# Patient Record
Sex: FEMALE | Race: OTHER | HISPANIC OR LATINO
[De-identification: names, ages, dates, MRNs, and addresses within clinical notes are randomized per-mention and may not be internally consistent; named-entity substitution may affect disease eponyms.]

---

## 2017-04-27 ENCOUNTER — APPOINTMENT (OUTPATIENT)
Dept: CARDIOLOGY | Facility: CLINIC | Age: 32
End: 2017-04-27

## 2017-05-17 ENCOUNTER — APPOINTMENT (OUTPATIENT)
Dept: CARDIOLOGY | Facility: CLINIC | Age: 32
End: 2017-05-17

## 2017-06-06 ENCOUNTER — NON-APPOINTMENT (OUTPATIENT)
Age: 32
End: 2017-06-06

## 2017-06-06 ENCOUNTER — APPOINTMENT (OUTPATIENT)
Dept: CARDIOLOGY | Facility: CLINIC | Age: 32
End: 2017-06-06

## 2017-06-06 VITALS
WEIGHT: 208 LBS | HEIGHT: 67 IN | OXYGEN SATURATION: 99 % | HEART RATE: 60 BPM | DIASTOLIC BLOOD PRESSURE: 76 MMHG | BODY MASS INDEX: 32.65 KG/M2 | SYSTOLIC BLOOD PRESSURE: 125 MMHG

## 2017-06-20 ENCOUNTER — CHART COPY (OUTPATIENT)
Age: 32
End: 2017-06-20

## 2017-06-20 ENCOUNTER — APPOINTMENT (OUTPATIENT)
Dept: PLASTIC SURGERY | Facility: CLINIC | Age: 32
End: 2017-06-20

## 2017-06-20 VITALS
HEIGHT: 68 IN | HEART RATE: 68 BPM | OXYGEN SATURATION: 100 % | BODY MASS INDEX: 31.38 KG/M2 | TEMPERATURE: 98.2 F | RESPIRATION RATE: 16 BRPM | DIASTOLIC BLOOD PRESSURE: 67 MMHG | WEIGHT: 207.03 LBS | SYSTOLIC BLOOD PRESSURE: 111 MMHG

## 2017-06-20 DIAGNOSIS — Z95.818 PRESENCE OF OTHER CARDIAC IMPLANTS AND GRAFTS: ICD-10-CM

## 2017-08-30 ENCOUNTER — APPOINTMENT (OUTPATIENT)
Dept: GASTROENTEROLOGY | Facility: CLINIC | Age: 32
End: 2017-08-30
Payer: MEDICAID

## 2017-08-30 VITALS
HEIGHT: 68 IN | RESPIRATION RATE: 18 BRPM | WEIGHT: 203 LBS | HEART RATE: 62 BPM | DIASTOLIC BLOOD PRESSURE: 75 MMHG | SYSTOLIC BLOOD PRESSURE: 123 MMHG | BODY MASS INDEX: 30.77 KG/M2

## 2017-08-30 DIAGNOSIS — J30.9 ALLERGIC RHINITIS, UNSPECIFIED: ICD-10-CM

## 2017-08-30 DIAGNOSIS — K59.09 OTHER CONSTIPATION: ICD-10-CM

## 2017-08-30 DIAGNOSIS — Z80.0 FAMILY HISTORY OF MALIGNANT NEOPLASM OF DIGESTIVE ORGANS: ICD-10-CM

## 2017-08-30 DIAGNOSIS — I48.0 PAROXYSMAL ATRIAL FIBRILLATION: ICD-10-CM

## 2017-08-30 PROCEDURE — 99203 OFFICE O/P NEW LOW 30 MIN: CPT

## 2017-08-30 RX ORDER — CHOLECALCIFEROL (VITAMIN D3) 1250 MCG
1.25 MG CAPSULE ORAL
Refills: 0 | Status: DISCONTINUED | COMMUNITY

## 2017-08-30 RX ORDER — FLUTICASONE PROPIONATE 50 UG/1
50 SPRAY, METERED NASAL
Refills: 0 | Status: DISCONTINUED | COMMUNITY

## 2017-08-30 RX ORDER — LORATADINE 10 MG
10 TABLET ORAL
Refills: 0 | Status: DISCONTINUED | COMMUNITY

## 2017-08-30 RX ORDER — FAMOTIDINE 40 MG/1
40 TABLET, FILM COATED ORAL
Refills: 0 | Status: DISCONTINUED | COMMUNITY

## 2018-06-05 ENCOUNTER — NON-APPOINTMENT (OUTPATIENT)
Age: 33
End: 2018-06-05

## 2018-06-05 ENCOUNTER — APPOINTMENT (OUTPATIENT)
Dept: CARDIOLOGY | Facility: CLINIC | Age: 33
End: 2018-06-05
Payer: COMMERCIAL

## 2018-06-05 VITALS
WEIGHT: 206 LBS | DIASTOLIC BLOOD PRESSURE: 78 MMHG | HEART RATE: 65 BPM | OXYGEN SATURATION: 99 % | BODY MASS INDEX: 31.32 KG/M2 | SYSTOLIC BLOOD PRESSURE: 120 MMHG

## 2018-06-05 DIAGNOSIS — R07.9 CHEST PAIN, UNSPECIFIED: ICD-10-CM

## 2018-06-05 PROCEDURE — 93000 ELECTROCARDIOGRAM COMPLETE: CPT

## 2018-06-05 PROCEDURE — 99215 OFFICE O/P EST HI 40 MIN: CPT

## 2018-06-25 ENCOUNTER — APPOINTMENT (OUTPATIENT)
Dept: GASTROENTEROLOGY | Facility: CLINIC | Age: 33
End: 2018-06-25
Payer: COMMERCIAL

## 2018-06-25 VITALS
SYSTOLIC BLOOD PRESSURE: 120 MMHG | OXYGEN SATURATION: 99 % | DIASTOLIC BLOOD PRESSURE: 70 MMHG | BODY MASS INDEX: 31.52 KG/M2 | HEIGHT: 68 IN | HEART RATE: 62 BPM | WEIGHT: 208 LBS

## 2018-06-25 PROCEDURE — 99214 OFFICE O/P EST MOD 30 MIN: CPT

## 2018-06-25 RX ORDER — MULTIVITAMIN
TABLET ORAL
Refills: 0 | Status: DISCONTINUED | COMMUNITY
End: 2018-06-25

## 2018-06-25 RX ORDER — DOCUSATE SODIUM 100 MG/1
100 CAPSULE ORAL
Refills: 0 | Status: DISCONTINUED | COMMUNITY
End: 2018-06-25

## 2018-07-04 ENCOUNTER — MOBILE ON CALL (OUTPATIENT)
Age: 33
End: 2018-07-04

## 2018-07-10 ENCOUNTER — FORM ENCOUNTER (OUTPATIENT)
Age: 33
End: 2018-07-10

## 2018-07-10 ENCOUNTER — APPOINTMENT (OUTPATIENT)
Dept: PLASTIC SURGERY | Facility: CLINIC | Age: 33
End: 2018-07-10

## 2018-07-11 ENCOUNTER — APPOINTMENT (OUTPATIENT)
Dept: CT IMAGING | Facility: CLINIC | Age: 33
End: 2018-07-11
Payer: COMMERCIAL

## 2018-07-11 ENCOUNTER — OUTPATIENT (OUTPATIENT)
Dept: OUTPATIENT SERVICES | Facility: HOSPITAL | Age: 33
LOS: 1 days | End: 2018-07-11
Payer: COMMERCIAL

## 2018-07-11 DIAGNOSIS — Z95.818 PRESENCE OF OTHER CARDIAC IMPLANTS AND GRAFTS: Chronic | ICD-10-CM

## 2018-07-11 DIAGNOSIS — R10.33 PERIUMBILICAL PAIN: ICD-10-CM

## 2018-07-11 PROCEDURE — 74177 CT ABD & PELVIS W/CONTRAST: CPT | Mod: 26

## 2018-07-11 PROCEDURE — 74177 CT ABD & PELVIS W/CONTRAST: CPT

## 2018-07-28 PROBLEM — Z80.0 FAMILY HISTORY OF COLON CANCER: Status: INACTIVE | Noted: 2017-08-30

## 2018-11-01 ENCOUNTER — OTHER (OUTPATIENT)
Age: 33
End: 2018-11-01

## 2018-11-01 DIAGNOSIS — R10.33 PERIUMBILICAL PAIN: ICD-10-CM

## 2018-11-11 ENCOUNTER — FORM ENCOUNTER (OUTPATIENT)
Age: 33
End: 2018-11-11

## 2018-11-12 ENCOUNTER — OUTPATIENT (OUTPATIENT)
Dept: OUTPATIENT SERVICES | Facility: HOSPITAL | Age: 33
LOS: 1 days | End: 2018-11-12
Payer: COMMERCIAL

## 2018-11-12 ENCOUNTER — APPOINTMENT (OUTPATIENT)
Dept: ULTRASOUND IMAGING | Facility: CLINIC | Age: 33
End: 2018-11-12
Payer: COMMERCIAL

## 2018-11-12 DIAGNOSIS — R10.33 PERIUMBILICAL PAIN: ICD-10-CM

## 2018-11-12 DIAGNOSIS — Z95.818 PRESENCE OF OTHER CARDIAC IMPLANTS AND GRAFTS: Chronic | ICD-10-CM

## 2018-11-12 PROCEDURE — 76700 US EXAM ABDOM COMPLETE: CPT | Mod: 26

## 2018-11-12 PROCEDURE — 76700 US EXAM ABDOM COMPLETE: CPT

## 2018-11-23 ENCOUNTER — TRANSCRIPTION ENCOUNTER (OUTPATIENT)
Age: 33
End: 2018-11-23

## 2019-05-01 ENCOUNTER — ASOB RESULT (OUTPATIENT)
Age: 34
End: 2019-05-01

## 2019-05-01 ENCOUNTER — APPOINTMENT (OUTPATIENT)
Age: 34
End: 2019-05-01
Payer: MEDICAID

## 2019-05-01 PROCEDURE — 76817 TRANSVAGINAL US OBSTETRIC: CPT

## 2019-06-04 ENCOUNTER — APPOINTMENT (OUTPATIENT)
Dept: CARDIOLOGY | Facility: CLINIC | Age: 34
End: 2019-06-04
Payer: MEDICAID

## 2019-06-04 ENCOUNTER — NON-APPOINTMENT (OUTPATIENT)
Age: 34
End: 2019-06-04

## 2019-06-04 VITALS
BODY MASS INDEX: 31.52 KG/M2 | OXYGEN SATURATION: 100 % | HEIGHT: 68 IN | HEART RATE: 71 BPM | SYSTOLIC BLOOD PRESSURE: 130 MMHG | DIASTOLIC BLOOD PRESSURE: 77 MMHG | WEIGHT: 208 LBS

## 2019-06-04 DIAGNOSIS — L91.0 HYPERTROPHIC SCAR: ICD-10-CM

## 2019-06-04 PROCEDURE — 99214 OFFICE O/P EST MOD 30 MIN: CPT

## 2019-06-04 PROCEDURE — 93000 ELECTROCARDIOGRAM COMPLETE: CPT

## 2019-09-01 ENCOUNTER — TRANSCRIPTION ENCOUNTER (OUTPATIENT)
Age: 34
End: 2019-09-01

## 2019-10-07 ENCOUNTER — APPOINTMENT (OUTPATIENT)
Dept: GASTROENTEROLOGY | Facility: CLINIC | Age: 34
End: 2019-10-07
Payer: COMMERCIAL

## 2019-10-07 VITALS
HEART RATE: 80 BPM | SYSTOLIC BLOOD PRESSURE: 140 MMHG | DIASTOLIC BLOOD PRESSURE: 101 MMHG | RESPIRATION RATE: 16 BRPM | BODY MASS INDEX: 31.17 KG/M2 | WEIGHT: 205 LBS | OXYGEN SATURATION: 98 %

## 2019-10-07 DIAGNOSIS — R10.13 EPIGASTRIC PAIN: ICD-10-CM

## 2019-10-07 PROCEDURE — 99214 OFFICE O/P EST MOD 30 MIN: CPT

## 2019-10-07 RX ORDER — UBIDECARENONE 200 MG
500 CAPSULE ORAL
Refills: 0 | Status: DISCONTINUED | COMMUNITY
End: 2019-10-07

## 2019-10-07 RX ORDER — ASPIRIN 81 MG/1
81 TABLET, CHEWABLE ORAL
Refills: 0 | Status: DISCONTINUED | COMMUNITY
End: 2019-10-07

## 2019-10-07 NOTE — ASSESSMENT
[FreeTextEntry1] : Chronic dyspepsia and regurgitation. Note GI alarm symptoms and was restarted on PPI medication. Worse when medications health. Omeprazole was renewed for 6 months. He will be held for 2 weeks prior to the procedure. Upper endoscopy was offered to the patient. The procedure, its risks, benefits, and prepped, were explained to the patient, who understands and is agreeable to proceed. ASA #2. No blood work necessary. Results to follow.

## 2019-10-07 NOTE — HISTORY OF PRESENT ILLNESS
[FreeTextEntry1] : 33-year-old white female, non-English-speaking with history of dyspepsia and heartburn. Patient had been trouble with epigastric and left upper quadrant abdominal pain. Last year. Blood work was unrevealing. Urea breath test was negative. CAT scan and sonogram were negative. Patient had done fairly well over the last year, but over the course of the past few months has developed increasing dyspepsia and heartburn. No alarm symptoms appear bleeding, fever, or weight loss. Omeprazole as represcribed primary care physician and patient was referred for endoscopy. No bleeding. No upper GI alarm symptoms. No lower GI symptoms.

## 2019-10-07 NOTE — PHYSICAL EXAM
[General Appearance - Alert] : alert [General Appearance - In No Acute Distress] : in no acute distress [Sclera] : the sclera and conjunctiva were normal [PERRL With Normal Accommodation] : pupils were equal in size, round, and reactive to light [Extraocular Movements] : extraocular movements were intact [Outer Ear] : the ears and nose were normal in appearance [Oropharynx] : the oropharynx was normal [Neck Appearance] : the appearance of the neck was normal [Neck Cervical Mass (___cm)] : no neck mass was observed [Jugular Venous Distention Increased] : there was no jugular-venous distention [Thyroid Diffuse Enlargement] : the thyroid was not enlarged [Thyroid Nodule] : there were no palpable thyroid nodules [Auscultation Breath Sounds / Voice Sounds] : lungs were clear to auscultation bilaterally [Heart Rate And Rhythm] : heart rate was normal and rhythm regular [Heart Sounds] : normal S1 and S2 [Heart Sounds Gallop] : no gallops [Murmurs] : no murmurs [Heart Sounds Pericardial Friction Rub] : no pericardial rub [Bowel Sounds] : normal bowel sounds [Abdomen Soft] : soft [Abdomen Tenderness] : non-tender [] : no hepato-splenomegaly [Abdomen Mass (___ Cm)] : no abdominal mass palpated [Patient Refused] : rectal exam was refused by the patient [FreeTextEntry1] : Facial garretacea

## 2019-12-30 ENCOUNTER — APPOINTMENT (OUTPATIENT)
Dept: DERMATOLOGY | Facility: CLINIC | Age: 34
End: 2019-12-30
Payer: COMMERCIAL

## 2019-12-30 PROCEDURE — 99202 OFFICE O/P NEW SF 15 MIN: CPT

## 2020-01-10 ENCOUNTER — APPOINTMENT (OUTPATIENT)
Dept: GASTROENTEROLOGY | Facility: GI CENTER | Age: 35
End: 2020-01-10
Payer: COMMERCIAL

## 2020-01-10 ENCOUNTER — OUTPATIENT (OUTPATIENT)
Dept: OUTPATIENT SERVICES | Facility: HOSPITAL | Age: 35
LOS: 1 days | End: 2020-01-10
Payer: COMMERCIAL

## 2020-01-10 ENCOUNTER — RESULT REVIEW (OUTPATIENT)
Age: 35
End: 2020-01-10

## 2020-01-10 DIAGNOSIS — Z95.818 PRESENCE OF OTHER CARDIAC IMPLANTS AND GRAFTS: Chronic | ICD-10-CM

## 2020-01-10 DIAGNOSIS — R12 HEARTBURN: ICD-10-CM

## 2020-01-10 DIAGNOSIS — R10.13 EPIGASTRIC PAIN: ICD-10-CM

## 2020-01-10 DIAGNOSIS — Z86.39 PERSONAL HISTORY OF OTHER ENDOCRINE, NUTRITIONAL AND METABOLIC DISEASE: ICD-10-CM

## 2020-01-10 PROCEDURE — 88305 TISSUE EXAM BY PATHOLOGIST: CPT

## 2020-01-10 PROCEDURE — 43239 EGD BIOPSY SINGLE/MULTIPLE: CPT

## 2020-01-10 PROCEDURE — 88342 IMHCHEM/IMCYTCHM 1ST ANTB: CPT

## 2020-01-10 PROCEDURE — 88342 IMHCHEM/IMCYTCHM 1ST ANTB: CPT | Mod: 26

## 2020-01-10 PROCEDURE — 88305 TISSUE EXAM BY PATHOLOGIST: CPT | Mod: 26

## 2020-01-10 NOTE — HISTORY OF PRESENT ILLNESS
[FreeTextEntry1] : 33 yo WF with dyspepsia & Heartburn.  Neg BW and CT.  No new medical issues.  BMI 32.  No new medical issues.

## 2020-01-10 NOTE — ASSESSMENT
[FreeTextEntry1] : Chronic dyspepsia / NERD.  Normal EGD;  call in 1 week for path check.  Ok to resume PPI prn.  GI office follow up in 2 months

## 2020-01-10 NOTE — PROCEDURE
[With Biopsy] : with biopsy [Dyspepsia] : dyspepsia [Epigastric Pain] : epigastric pain [Procedure Explained] : The procedure was explained [Heartburn] : heartburn [Allergies Reviewed] : allergies reviewed. [Benefits] : benefits [Risks] : Risks [Alternatives] : alternatives [Consent Obtained] : written consent was obtained prior to the procedure and is detailed in the patient's record [Cardiac Monitor] : cardiac monitor [Automated Blood Pressure Cuff] : automated blood pressure cuff [Patient] : the patient [Pulse Oximeter] : pulse oximeter [Propofol ___ mg IV] : Propofol [unfilled] ~Umg intravenously [3] : 3 [Normal] : Normal [Sent to Pathology] : was sent to pathology for analysis [Vital Signs Stable] : the vital signs were stable [Tolerated Well] : the patient tolerated the procedure well [de-identified] : 4 random biopsies were done.  [de-identified] : Z line intact at 38 cm.  no HH, EE, BE, Varices.   [de-identified] : [id number] [de-identified] : Normal Endoscopy exam.  Biopsies done for HP.   [de-identified] : 2 random biopsies were done.

## 2020-01-10 NOTE — PROCEDURE
[With Biopsy] : with biopsy [Dyspepsia] : dyspepsia [Heartburn] : heartburn [Epigastric Pain] : epigastric pain [Procedure Explained] : The procedure was explained [Allergies Reviewed] : allergies reviewed. [Benefits] : benefits [Risks] : Risks [Alternatives] : alternatives [Consent Obtained] : written consent was obtained prior to the procedure and is detailed in the patient's record [Cardiac Monitor] : cardiac monitor [Patient] : the patient [Automated Blood Pressure Cuff] : automated blood pressure cuff [Pulse Oximeter] : pulse oximeter [3] : 3 [Propofol ___ mg IV] : Propofol [unfilled] ~Umg intravenously [Sent to Pathology] : was sent to pathology for analysis [Normal] : Normal [Vital Signs Stable] : the vital signs were stable [Tolerated Well] : the patient tolerated the procedure well [de-identified] : Z line intact at 38 cm.  no HH, EE, BE, Varices.   [de-identified] : 4 random biopsies were done.  [de-identified] : [id number] [de-identified] : 2 random biopsies were done.  [de-identified] : Normal Endoscopy exam.  Biopsies done for HP.

## 2020-01-10 NOTE — HISTORY OF PRESENT ILLNESS
[FreeTextEntry1] : 35 yo WF with dyspepsia & Heartburn.  Neg BW and CT.  No new medical issues.  BMI 32.  No new medical issues.

## 2020-01-10 NOTE — PHYSICAL EXAM
[General Appearance - In No Acute Distress] : in no acute distress [General Appearance - Alert] : alert [Sclera] : the sclera and conjunctiva were normal [Extraocular Movements] : extraocular movements were intact [PERRL With Normal Accommodation] : pupils were equal in size, round, and reactive to light [Outer Ear] : the ears and nose were normal in appearance [Oropharynx] : the oropharynx was normal [Neck Appearance] : the appearance of the neck was normal [Jugular Venous Distention Increased] : there was no jugular-venous distention [Neck Cervical Mass (___cm)] : no neck mass was observed [Thyroid Diffuse Enlargement] : the thyroid was not enlarged [Thyroid Nodule] : there were no palpable thyroid nodules [Heart Rate And Rhythm] : heart rate was normal and rhythm regular [Heart Sounds] : normal S1 and S2 [Auscultation Breath Sounds / Voice Sounds] : lungs were clear to auscultation bilaterally [Murmurs] : no murmurs [Heart Sounds Pericardial Friction Rub] : no pericardial rub [Heart Sounds Gallop] : no gallops [Bowel Sounds] : normal bowel sounds [Abdomen Tenderness] : non-tender [Abdomen Soft] : soft [] : no hepato-splenomegaly [Abdomen Mass (___ Cm)] : no abdominal mass palpated [FreeTextEntry1] : No scars or organomegaly.  no hernias [Patient Refused] : rectal exam was refused by the patient

## 2020-01-10 NOTE — PHYSICAL EXAM
[General Appearance - In No Acute Distress] : in no acute distress [General Appearance - Alert] : alert [Sclera] : the sclera and conjunctiva were normal [Extraocular Movements] : extraocular movements were intact [PERRL With Normal Accommodation] : pupils were equal in size, round, and reactive to light [Oropharynx] : the oropharynx was normal [Neck Appearance] : the appearance of the neck was normal [Outer Ear] : the ears and nose were normal in appearance [Jugular Venous Distention Increased] : there was no jugular-venous distention [Neck Cervical Mass (___cm)] : no neck mass was observed [Thyroid Nodule] : there were no palpable thyroid nodules [Thyroid Diffuse Enlargement] : the thyroid was not enlarged [Heart Sounds] : normal S1 and S2 [Heart Rate And Rhythm] : heart rate was normal and rhythm regular [Auscultation Breath Sounds / Voice Sounds] : lungs were clear to auscultation bilaterally [Heart Sounds Pericardial Friction Rub] : no pericardial rub [Heart Sounds Gallop] : no gallops [Murmurs] : no murmurs [Bowel Sounds] : normal bowel sounds [Abdomen Soft] : soft [Abdomen Tenderness] : non-tender [] : no hepato-splenomegaly [Abdomen Mass (___ Cm)] : no abdominal mass palpated [FreeTextEntry1] : No scars or organomegaly.  no hernias [Patient Refused] : rectal exam was refused by the patient

## 2020-01-15 LAB — SURGICAL PATHOLOGY STUDY: SIGNIFICANT CHANGE UP

## 2020-01-30 ENCOUNTER — APPOINTMENT (OUTPATIENT)
Dept: DERMATOLOGY | Facility: CLINIC | Age: 35
End: 2020-01-30
Payer: COMMERCIAL

## 2020-01-30 PROCEDURE — 99213 OFFICE O/P EST LOW 20 MIN: CPT

## 2020-06-16 ENCOUNTER — APPOINTMENT (OUTPATIENT)
Dept: CARDIOLOGY | Facility: CLINIC | Age: 35
End: 2020-06-16

## 2020-06-24 ENCOUNTER — APPOINTMENT (OUTPATIENT)
Dept: CARDIOLOGY | Facility: CLINIC | Age: 35
End: 2020-06-24

## 2020-07-29 ENCOUNTER — APPOINTMENT (OUTPATIENT)
Dept: CARDIOLOGY | Facility: CLINIC | Age: 35
End: 2020-07-29
Payer: COMMERCIAL

## 2020-07-29 VITALS
BODY MASS INDEX: 30.41 KG/M2 | SYSTOLIC BLOOD PRESSURE: 110 MMHG | OXYGEN SATURATION: 97 % | HEART RATE: 67 BPM | DIASTOLIC BLOOD PRESSURE: 78 MMHG | TEMPERATURE: 98.4 F | WEIGHT: 200 LBS

## 2020-07-29 DIAGNOSIS — R10.13 EPIGASTRIC PAIN: ICD-10-CM

## 2020-07-29 PROCEDURE — 99214 OFFICE O/P EST MOD 30 MIN: CPT

## 2020-07-29 PROCEDURE — 93000 ELECTROCARDIOGRAM COMPLETE: CPT

## 2020-07-29 RX ORDER — ASPIRIN 81 MG/1
81 TABLET ORAL DAILY
Qty: 30 | Refills: 2 | Status: ACTIVE | COMMUNITY
Start: 2020-07-29

## 2020-08-02 ENCOUNTER — NON-APPOINTMENT (OUTPATIENT)
Age: 35
End: 2020-08-02

## 2021-08-03 ENCOUNTER — APPOINTMENT (OUTPATIENT)
Dept: CARDIOLOGY | Facility: CLINIC | Age: 36
End: 2021-08-03
Payer: COMMERCIAL

## 2021-08-03 VITALS
SYSTOLIC BLOOD PRESSURE: 130 MMHG | TEMPERATURE: 98.5 F | BODY MASS INDEX: 32.28 KG/M2 | WEIGHT: 213 LBS | HEIGHT: 68 IN | HEART RATE: 69 BPM | OXYGEN SATURATION: 99 % | DIASTOLIC BLOOD PRESSURE: 82 MMHG

## 2021-08-03 PROCEDURE — 99214 OFFICE O/P EST MOD 30 MIN: CPT

## 2021-08-03 PROCEDURE — 93000 ELECTROCARDIOGRAM COMPLETE: CPT

## 2021-08-03 RX ORDER — BACILLUS COAGULANS/INULIN 1B-250 MG
CAPSULE ORAL
Refills: 0 | Status: DISCONTINUED | COMMUNITY
End: 2021-08-03

## 2021-08-03 RX ORDER — OMEPRAZOLE 40 MG/1
40 CAPSULE, DELAYED RELEASE ORAL
Qty: 90 | Refills: 1 | Status: DISCONTINUED | COMMUNITY
Start: 2019-10-07 | End: 2021-08-03

## 2021-08-03 RX ORDER — FAMOTIDINE 40 MG/1
40 TABLET, FILM COATED ORAL
Qty: 90 | Refills: 1 | Status: DISCONTINUED | COMMUNITY
Start: 2020-01-10 | End: 2021-08-03

## 2021-08-27 ENCOUNTER — APPOINTMENT (OUTPATIENT)
Dept: CARDIOLOGY | Facility: CLINIC | Age: 36
End: 2021-08-27
Payer: COMMERCIAL

## 2021-08-27 PROCEDURE — 93306 TTE W/DOPPLER COMPLETE: CPT

## 2021-10-05 ENCOUNTER — APPOINTMENT (OUTPATIENT)
Dept: DERMATOLOGY | Facility: CLINIC | Age: 36
End: 2021-10-05
Payer: COMMERCIAL

## 2021-10-05 PROCEDURE — 99214 OFFICE O/P EST MOD 30 MIN: CPT

## 2022-04-14 ENCOUNTER — TRANSCRIPTION ENCOUNTER (OUTPATIENT)
Age: 37
End: 2022-04-14

## 2022-08-02 ENCOUNTER — NON-APPOINTMENT (OUTPATIENT)
Age: 37
End: 2022-08-02

## 2022-08-02 ENCOUNTER — APPOINTMENT (OUTPATIENT)
Dept: CARDIOLOGY | Facility: CLINIC | Age: 37
End: 2022-08-02

## 2022-08-02 VITALS
DIASTOLIC BLOOD PRESSURE: 80 MMHG | HEIGHT: 68 IN | TEMPERATURE: 98.2 F | BODY MASS INDEX: 31.52 KG/M2 | SYSTOLIC BLOOD PRESSURE: 122 MMHG | OXYGEN SATURATION: 97 % | HEART RATE: 60 BPM | WEIGHT: 208 LBS

## 2022-08-02 PROCEDURE — 93000 ELECTROCARDIOGRAM COMPLETE: CPT

## 2022-08-02 PROCEDURE — 99214 OFFICE O/P EST MOD 30 MIN: CPT | Mod: 25

## 2024-01-08 ENCOUNTER — ASOB RESULT (OUTPATIENT)
Age: 39
End: 2024-01-08

## 2024-01-08 ENCOUNTER — APPOINTMENT (OUTPATIENT)
Dept: ANTEPARTUM | Facility: CLINIC | Age: 39
End: 2024-01-08
Payer: MEDICAID

## 2024-01-08 PROCEDURE — 76801 OB US < 14 WKS SINGLE FETUS: CPT

## 2024-01-08 PROCEDURE — 76815 OB US LIMITED FETUS(S): CPT

## 2024-01-22 ENCOUNTER — APPOINTMENT (OUTPATIENT)
Dept: ANTEPARTUM | Facility: CLINIC | Age: 39
End: 2024-01-22

## 2024-01-29 ENCOUNTER — ASOB RESULT (OUTPATIENT)
Age: 39
End: 2024-01-29

## 2024-01-29 ENCOUNTER — APPOINTMENT (OUTPATIENT)
Dept: ANTEPARTUM | Facility: CLINIC | Age: 39
End: 2024-01-29
Payer: MEDICAID

## 2024-01-29 PROCEDURE — 76815 OB US LIMITED FETUS(S): CPT

## 2024-03-18 ENCOUNTER — ASOB RESULT (OUTPATIENT)
Age: 39
End: 2024-03-18

## 2024-03-18 ENCOUNTER — APPOINTMENT (OUTPATIENT)
Dept: ANTEPARTUM | Facility: CLINIC | Age: 39
End: 2024-03-18
Payer: MEDICAID

## 2024-03-18 PROCEDURE — 76817 TRANSVAGINAL US OBSTETRIC: CPT

## 2024-03-18 PROCEDURE — 76805 OB US >/= 14 WKS SNGL FETUS: CPT | Mod: 59

## 2024-04-02 ENCOUNTER — EMERGENCY (EMERGENCY)
Facility: HOSPITAL | Age: 39
LOS: 1 days | Discharge: HOME | End: 2024-04-02
Attending: STUDENT IN AN ORGANIZED HEALTH CARE EDUCATION/TRAINING PROGRAM
Payer: MEDICAID

## 2024-04-02 VITALS
WEIGHT: 199.96 LBS | SYSTOLIC BLOOD PRESSURE: 121 MMHG | RESPIRATION RATE: 16 BRPM | HEART RATE: 64 BPM | OXYGEN SATURATION: 99 % | DIASTOLIC BLOOD PRESSURE: 72 MMHG | HEIGHT: 65 IN | TEMPERATURE: 97 F

## 2024-04-02 VITALS — OXYGEN SATURATION: 100 % | RESPIRATION RATE: 17 BRPM | HEART RATE: 63 BPM | TEMPERATURE: 98 F

## 2024-04-02 DIAGNOSIS — Z95.818 PRESENCE OF OTHER CARDIAC IMPLANTS AND GRAFTS: Chronic | ICD-10-CM

## 2024-04-02 LAB
ALBUMIN SERPL ELPH-MCNC: 3.3 G/DL — SIGNIFICANT CHANGE UP (ref 3.3–5.2)
ALP SERPL-CCNC: 73 U/L — SIGNIFICANT CHANGE UP (ref 40–120)
ALT FLD-CCNC: 12 U/L — SIGNIFICANT CHANGE UP
ANION GAP SERPL CALC-SCNC: 10 MMOL/L — SIGNIFICANT CHANGE UP (ref 5–17)
APPEARANCE UR: CLEAR — SIGNIFICANT CHANGE UP
AST SERPL-CCNC: 15 U/L — SIGNIFICANT CHANGE UP
BACTERIA # UR AUTO: NEGATIVE /HPF — SIGNIFICANT CHANGE UP
BASOPHILS # BLD AUTO: 0.03 K/UL — SIGNIFICANT CHANGE UP (ref 0–0.2)
BASOPHILS NFR BLD AUTO: 0.3 % — SIGNIFICANT CHANGE UP (ref 0–2)
BILIRUB SERPL-MCNC: <0.2 MG/DL — LOW (ref 0.4–2)
BILIRUB UR-MCNC: NEGATIVE — SIGNIFICANT CHANGE UP
BUN SERPL-MCNC: 8.2 MG/DL — SIGNIFICANT CHANGE UP (ref 8–20)
CALCIUM SERPL-MCNC: 8.7 MG/DL — SIGNIFICANT CHANGE UP (ref 8.4–10.5)
CAST: 5 /LPF — HIGH (ref 0–4)
CHLORIDE SERPL-SCNC: 100 MMOL/L — SIGNIFICANT CHANGE UP (ref 96–108)
CO2 SERPL-SCNC: 21 MMOL/L — LOW (ref 22–29)
COLOR SPEC: YELLOW — SIGNIFICANT CHANGE UP
CREAT SERPL-MCNC: 0.48 MG/DL — LOW (ref 0.5–1.3)
DIFF PNL FLD: NEGATIVE — SIGNIFICANT CHANGE UP
EGFR: 126 ML/MIN/1.73M2 — SIGNIFICANT CHANGE UP
EOSINOPHIL # BLD AUTO: 0.04 K/UL — SIGNIFICANT CHANGE UP (ref 0–0.5)
EOSINOPHIL NFR BLD AUTO: 0.4 % — SIGNIFICANT CHANGE UP (ref 0–6)
GLUCOSE SERPL-MCNC: 88 MG/DL — SIGNIFICANT CHANGE UP (ref 70–99)
GLUCOSE UR QL: NEGATIVE MG/DL — SIGNIFICANT CHANGE UP
HCT VFR BLD CALC: 30.3 % — LOW (ref 34.5–45)
HGB BLD-MCNC: 10.1 G/DL — LOW (ref 11.5–15.5)
IMM GRANULOCYTES NFR BLD AUTO: 0.6 % — SIGNIFICANT CHANGE UP (ref 0–0.9)
KETONES UR-MCNC: NEGATIVE MG/DL — SIGNIFICANT CHANGE UP
LEUKOCYTE ESTERASE UR-ACNC: ABNORMAL
LYMPHOCYTES # BLD AUTO: 1.31 K/UL — SIGNIFICANT CHANGE UP (ref 1–3.3)
LYMPHOCYTES # BLD AUTO: 13.4 % — SIGNIFICANT CHANGE UP (ref 13–44)
MCHC RBC-ENTMCNC: 26.9 PG — LOW (ref 27–34)
MCHC RBC-ENTMCNC: 33.3 GM/DL — SIGNIFICANT CHANGE UP (ref 32–36)
MCV RBC AUTO: 80.8 FL — SIGNIFICANT CHANGE UP (ref 80–100)
MONOCYTES # BLD AUTO: 0.81 K/UL — SIGNIFICANT CHANGE UP (ref 0–0.9)
MONOCYTES NFR BLD AUTO: 8.3 % — SIGNIFICANT CHANGE UP (ref 2–14)
NEUTROPHILS # BLD AUTO: 7.54 K/UL — HIGH (ref 1.8–7.4)
NEUTROPHILS NFR BLD AUTO: 77 % — SIGNIFICANT CHANGE UP (ref 43–77)
NITRITE UR-MCNC: NEGATIVE — SIGNIFICANT CHANGE UP
PH UR: 7.5 — SIGNIFICANT CHANGE UP (ref 5–8)
PLATELET # BLD AUTO: 196 K/UL — SIGNIFICANT CHANGE UP (ref 150–400)
POTASSIUM SERPL-MCNC: 4.1 MMOL/L — SIGNIFICANT CHANGE UP (ref 3.5–5.3)
POTASSIUM SERPL-SCNC: 4.1 MMOL/L — SIGNIFICANT CHANGE UP (ref 3.5–5.3)
PROT SERPL-MCNC: 6.7 G/DL — SIGNIFICANT CHANGE UP (ref 6.6–8.7)
PROT UR-MCNC: SIGNIFICANT CHANGE UP MG/DL
RBC # BLD: 3.75 M/UL — LOW (ref 3.8–5.2)
RBC # FLD: 14.7 % — HIGH (ref 10.3–14.5)
RBC CASTS # UR COMP ASSIST: 1 /HPF — SIGNIFICANT CHANGE UP (ref 0–4)
SODIUM SERPL-SCNC: 131 MMOL/L — LOW (ref 135–145)
SP GR SPEC: 1.01 — SIGNIFICANT CHANGE UP (ref 1–1.03)
SQUAMOUS # UR AUTO: 4 /HPF — SIGNIFICANT CHANGE UP (ref 0–5)
UROBILINOGEN FLD QL: 1 MG/DL — SIGNIFICANT CHANGE UP (ref 0.2–1)
WBC # BLD: 9.79 K/UL — SIGNIFICANT CHANGE UP (ref 3.8–10.5)
WBC # FLD AUTO: 9.79 K/UL — SIGNIFICANT CHANGE UP (ref 3.8–10.5)
WBC UR QL: 9 /HPF — HIGH (ref 0–5)

## 2024-04-02 PROCEDURE — 93005 ELECTROCARDIOGRAM TRACING: CPT

## 2024-04-02 PROCEDURE — 85025 COMPLETE CBC W/AUTO DIFF WBC: CPT

## 2024-04-02 PROCEDURE — 82962 GLUCOSE BLOOD TEST: CPT

## 2024-04-02 PROCEDURE — 99284 EMERGENCY DEPT VISIT MOD MDM: CPT | Mod: 25

## 2024-04-02 PROCEDURE — 93010 ELECTROCARDIOGRAM REPORT: CPT

## 2024-04-02 PROCEDURE — 36415 COLL VENOUS BLD VENIPUNCTURE: CPT

## 2024-04-02 PROCEDURE — 80053 COMPREHEN METABOLIC PANEL: CPT

## 2024-04-02 PROCEDURE — 81001 URINALYSIS AUTO W/SCOPE: CPT

## 2024-04-02 PROCEDURE — 99285 EMERGENCY DEPT VISIT HI MDM: CPT

## 2024-04-02 PROCEDURE — 87086 URINE CULTURE/COLONY COUNT: CPT

## 2024-04-02 RX ORDER — SODIUM CHLORIDE 9 MG/ML
1000 INJECTION INTRAMUSCULAR; INTRAVENOUS; SUBCUTANEOUS ONCE
Refills: 0 | Status: COMPLETED | OUTPATIENT
Start: 2024-04-02 | End: 2024-04-02

## 2024-04-02 RX ADMIN — SODIUM CHLORIDE 1000 MILLILITER(S): 9 INJECTION INTRAMUSCULAR; INTRAVENOUS; SUBCUTANEOUS at 11:24

## 2024-04-02 NOTE — ED PROVIDER NOTE - NSICDXFAMILYHX_GEN_ALL_CORE_FT
FAMILY HISTORY:  Father  Still living? Yes, Estimated age: 41-50  Family history of hypotension, Age at diagnosis: Age Unknown    Mother  Still living? Yes, Estimated age: 41-50  Family history of hypertension, Age at diagnosis: Age Unknown

## 2024-04-02 NOTE — ED ADULT NURSE NOTE - OBJECTIVE STATEMENT
Patient care assumed at 10:17am, A&O x 4, respiration even and unlabored, patient is a 38y old female who is currently pregnant ,  reports to ED with complain of dizziness and episode of syncope. Patients reports seeing dark which lasted for about 15mins with similar episode on Saturday which lasted about 5mins.  Patient also reports diaphoresis and earing beeping sound bilaterally. Patient denies any pain or discomfort. VSS. No cough, SOB or respiratory distress noted. Patient on Tele box with NSR. Safety maintained

## 2024-04-02 NOTE — ED ADULT NURSE NOTE - NSICDXPASTSURGICALHX_GEN_ALL_CORE_FT
PAST SURGICAL HISTORY:  Status post placement of implantable loop recorder [employer] Linq loop recorder implanted 4/10/2015

## 2024-04-02 NOTE — ED PROVIDER NOTE - NSICDXPASTSURGICALHX_GEN_ALL_CORE_FT
PAST SURGICAL HISTORY:  Status post placement of implantable loop recorder [employer] Linq loop recorder implanted 4/10/2015

## 2024-04-02 NOTE — ED ADULT TRIAGE NOTE - BP NONINVASIVE SYSTOLIC (MM HG)
[FreeTextEntry1] : Please refer to URO Consult note \par \par FUA elevated PSA and balanitis \par elevated PSA normalized to  0.83 \par balanitis \par avoid topical alcohol in foreskin and glands \par use clotrimazole cream \par 
121

## 2024-04-02 NOTE — ED PROVIDER NOTE - CLINICAL SUMMARY MEDICAL DECISION MAKING FREE TEXT BOX
38 year old female A1 w/PMHx pAfib s/p loop recorder s/p explanted 2016 BIBEMS from home for near syncope. Vitals stable. Patient appears well, alert and oriented. Examination benign. Lungs clear bilaterally. Will obtain labs, chest xray, EKG, UA+urine culture. Likely vasovagal/orthostatic given history and presentation. 38 year old female A1 w/PMHx pAfib s/p loop recorder s/p explanted 2016 BIBEMS from home for near syncope. Vitals stable. Patient appears well, alert and oriented. Examination benign. Lungs clear bilaterally. Will obtain labs, chest xray, EKG, UA+urine culture. Likely vasovagal/orthostatic given history and presentation. Labs non concerning. Bedside US showing good fetal movements and  with adequate variation. Patient will be discharged at this time with advise to follow up with their cardiologist and gyn. Patient understands and in agreement with plan.

## 2024-04-02 NOTE — ED PROCEDURE NOTE - PROCEDURE NAME, MLM
Point of Care Ultrasound OB Pelvic Vera Ricks is a 41 y.o. female who was seen in clinic today (2020) for an acute visit. Subjective:   Kamilla Garcia was seen today for   No chief complaint on file. 42 yo who works at [employer] [id number]. who arrives with ear pain, sinus congestion and sore throat. Tested negative about a week ago at [employer] [address].. Denies tick bite, fever or SOB. Exposed to C19+ patients at work. Recent Travel Screening and Travel History documentation     Travel Screening      No screening recorded since 20 0000      Travel History   Travel since 20     No documented travel since 20                 ROS - Pertinent items are noted in HPI    Patient Active Problem List   Diagnosis Code    IUGR (intrauterine growth restriction) [id number]    Oligohydramnios antepartum O41.00X0    Depression complicating pregnancy, antepartum O99.340, F32.9    H/O  section Z98.891    Environmental and seasonal allergies J30.89    Insomnia due to stress F51.02    Allergy desensitization therapy Z51.6    Recurrent depression ([place] [address].) F33.9    Vitamin D deficiency E55.9    Abscess L02.91     Home Medications    Medication Sig Start Date End Date Taking? Authorizing Provider   amoxicillin (AMOXIL) 500 mg capsule Take 1 Cap by mouth three (3) times daily for 10 days. 20 Yes Roman Arauz MD   methylPREDNISolone (MEDROL DOSEPACK) 4 mg tablet As directed 20   Shayy Smith,    montelukast (SINGULAIR) 10 mg tablet Take 1 Tab by mouth every evening. 19   Belgica Stroud NP   acetaminophen (TYLENOL EXTRA STRENGTH) 500 mg tablet Take  by mouth every six (6) hours as needed for Pain. Provider, Historical   azelastine (ASTELIN) 137 mcg (0.1 %) nasal spray 1 [place] by Both Nostrils route two (2) times a day.  Use in each nostril as directed  Indications: Seasonal Runny Nose, Vasomotor Rhinitis 19   Larraamanda RILEY NP   ipratropium (ATROVENT) 0.03 % nasal spray 2 Sprays by Both Nostrils route every twelve (12) hours. 5/14/19   Trice Baldwin, NP   valACYclovir (VALTREX) 1 gram tablet 2 tablets initially then 2 tablets q 12 hours as needed for lesion  Indications: Cold Sore 4/1/19   Trice Baldwin, NP   levonorgestrel (MIRENA) 20 mcg/24 hr (5 years) IUD 1 Each by IntraUTERine route once. Provider, Historical      Allergies   Allergen Reactions    Azithromycin Swelling     z pack          Objective:   Physical Exam    There were no vitals taken for this visit. General:  NAD. Nontoxic  Neck:  appears supple without nodes or JVD  Oropharynx:  no exudate  Respiratory:  speaks in complete sentences. Unlabored. CTA without wheezes, rales or ronchi    Cardiac:  RRR without MGR  Skin:  No rash      Assessment/Plan:     There is a LOW probability that this is COVID-19. Differential diagnosed was reviewed and favor: strep pharyngitis. I discussed this is most likely a bacterial infection. We reviewed treatment options including prescription & OTC medications. Will start meds below. Red flags were reviewed, expected time course for resolution was discussed, and when to follow up with their PCP. Diagnoses and all orders for this visit:    1. Sore throat  -     amoxicillin (AMOXIL) 500 mg capsule; Take 1 Cap by mouth three (3) times daily for 10 days. 2. COVID-19  -     NOVEL CORONAVIRUS (COVID-19)         Reviewed with her that COVID-19 pandemic is an evolving situation with rapidly changing recommendations & guidelines. Medical decisions are made based on the the best information available at the time. Recommended she stay tuned for updates published by trusted sources and to advise your PCP of any unexpected changes in clinical condition       Disclaimer:  Discussed expected course/resolution/complications of diagnosis in detail with patient. Medication risks/benefits/costs/interactions/alternatives discussed with patient.   She was given an after visit summary which includes diagnoses, current medications, & vitals. She expressed understanding with the diagnosis and plan. Aspects of this note may have been generated using voice recognition software. Despite editing, there may be some syntax errors.        Josh Farley MD

## 2024-04-02 NOTE — ED PROVIDER NOTE - ATTENDING CONTRIBUTION TO CARE
38F A1 presenting with light headedness, worse on sitting to standing, suspect mild dehydration as patient without any infectious symptoms or chest pain, no leg swelling; FHR on bedside sono ~130-140, good fetal movement. Will check labs including basics, ua; EKG is unchanged from prior and only shows a twi in III; will follow up studies, reassess, dispo.

## 2024-04-02 NOTE — ED PROVIDER NOTE - PATIENT PORTAL LINK FT
You can access the FollowMyHealth Patient Portal offered by [facility] by registering at the following website: [email or web address]. By joining [employer]’s FollowMyHealth portal, you will also be able to view your health information using other applications (apps) compatible with our system.

## 2024-04-02 NOTE — ED ADULT NURSE REASSESSMENT NOTE - NS ED NURSE REASSESS COMMENT FT1
Pt is A&O4, pt able to ambulate safely and steadily w/out assistance, denies dizziness/weakness upon standing, patients IV was removed and the catheter is in tact, bleeding controlled, pt discharged home and paperwork was signed, reviewed with patient. Follow up treatment and plan for discharge was reviewed with the patient. Vital Signs recorded in the EMR. Pt education deemed successful at time of discharge after teach back proves proficiency. Pt has no distress at time of discharge, pt provided discharge instruction paperwork.

## 2024-04-02 NOTE — ED ADULT TRIAGE NOTE - CHIEF COMPLAINT QUOTE
Pt BIBA from home for near syncopal episode. Pt states she has generalized weakness and ws feeling lightheaded for a few minutes. Has similar symptoms on Saturday. No H/O diabetes or HTN. Currently 22 weeks pregnant.

## 2024-04-02 NOTE — ED PROVIDER NOTE - OBJECTIVE STATEMENT
38 year old female A1 w/PMHx pAfib s/p loop recorder s/p explanted  BIBEMS from home for near syncope. Patient states that around 1 hour ago felt weak, lightheaded, and vision blackening. Episode lasted 2 minutes and resolved with sitting down. Had a similar episode on saturday that also resolved with rest. Denies any chest pain, palpitations, dizziness, headaches, SOB, numbness, tingling, or syncope. Denies any dysuria, hematuria, vaginal discharge or abd pain. Normal bowel and bladder movements. Has been regularly following with Dr. Gonsales (gyn). Notes that she has not seen a cardiologist since explantation of loop recorder. Currently on aspirin and prenatal vitamins.

## 2024-04-02 NOTE — ED PROVIDER NOTE - NSFOLLOWUPINSTRUCTIONS_ED_ALL_ED_FT
Near-Syncope       Near-syncope is when you suddenly feel like you might pass out (faint), but you do not actually lose consciousness. This may also be referred to as presyncope. During an episode of near-syncope, you may:    Feel dizzy, weak, or light-headed.  Feel nauseous.  See all white or all black in your field of vision, or see spots.  Have cold, clammy skin.    This condition is caused by a sudden decrease in blood flow to the brain. This decrease can result from various causes, but most of those causes are not dangerous. However, near-syncope may be a sign of a serious medical problem, so it is important to seek medical care.    Follow these instructions at home:      Medicines    Take over-the-counter and prescription medicines only as told by your health care provider.  If you are taking blood pressure or heart medicine, get up slowly and take several minutes to sit and then stand. This can reduce dizziness.        General instructions    Pay attention to any changes in your symptoms.  Talk with your health care provider about your symptoms. You may need to have testing to understand the cause of your near-syncope.  If you start to feel like you might faint, lie down right away and raise (elevate) your feet above the level of your heart. Breathe deeply and steadily. Wait until all of the symptoms have passed.  Have someone stay with you until you feel stable.  Do not drive, use machinery, or play sports until your health care provider says it is okay.  Drink enough fluid to keep your urine pale yellow.  Keep all follow-up visits as told by your health care provider. This is important.    Get help right away if you:  Have a seizure.  Have unusual pain in your chest, abdomen, or back.  Faint once or repeatedly.  Have a severe headache.  Are bleeding from your mouth or rectum, or you have black or tarry stool.  Have a very fast or irregular heartbeat (palpitations).  Are confused.  Have trouble walking.  Have severe weakness.  Have vision problems.    These symptoms may represent a serious problem that is an emergency. Do not wait to see if your symptoms will go away. Get medical help right away. Call your local emergency services (911 in the [place] Do not drive yourself to the hospital.    Summary  Near-syncope is when you suddenly feel like you might pass out (faint), but you do not actually lose consciousness.  This condition is caused by a sudden decrease in blood flow to the brain. This decrease can result from various causes, but most of those causes are not dangerous.  Near-syncope may be a sign of a serious medical problem, so it is important to seek medical care.    ADDITIONAL NOTES AND INSTRUCTIONS    Please follow up with your Primary MD in 24-48 hr.  Seek immediate medical care for any new/worsening signs or symptoms.

## 2024-04-04 LAB
CULTURE RESULTS: SIGNIFICANT CHANGE UP
SPECIMEN SOURCE: SIGNIFICANT CHANGE UP

## 2024-04-13 NOTE — ED POST DISCHARGE NOTE - RESULT SUMMARY
patient called back regarding received letter of abnormal culture results, informed patient UC contaminated and to have rpt if having urinary symptoms with PCP, patient understands and agrees to proceed.

## 2024-04-16 ENCOUNTER — NON-APPOINTMENT (OUTPATIENT)
Age: 39
End: 2024-04-16

## 2024-04-16 ENCOUNTER — APPOINTMENT (OUTPATIENT)
Dept: CARDIOLOGY | Facility: CLINIC | Age: 39
End: 2024-04-16
Payer: MEDICAID

## 2024-04-16 VITALS
HEART RATE: 73 BPM | TEMPERATURE: 97.2 F | SYSTOLIC BLOOD PRESSURE: 115 MMHG | HEIGHT: 68 IN | DIASTOLIC BLOOD PRESSURE: 74 MMHG | OXYGEN SATURATION: 100 % | WEIGHT: 214 LBS | BODY MASS INDEX: 32.43 KG/M2

## 2024-04-16 DIAGNOSIS — R00.2 PALPITATIONS: ICD-10-CM

## 2024-04-16 PROCEDURE — 93242 EXT ECG>48HR<7D RECORDING: CPT

## 2024-04-16 PROCEDURE — 99214 OFFICE O/P EST MOD 30 MIN: CPT

## 2024-04-16 PROCEDURE — G2211 COMPLEX E/M VISIT ADD ON: CPT | Mod: NC,1L

## 2024-04-16 PROCEDURE — 93000 ELECTROCARDIOGRAM COMPLETE: CPT | Mod: 59

## 2024-04-16 RX ORDER — PV W-O VIT A/FERROUS FUM/FOLIC 40 MG-1 MG
TABLET ORAL DAILY
Refills: 0 | Status: ACTIVE | COMMUNITY

## 2024-04-16 NOTE — HISTORY OF PRESENT ILLNESS
[FreeTextEntry1] : Patient with history of palpitations, noted to have afib on EKG- self converted to normal rhythm. No further palpitations episodes.  Chest pain: Has had episodes of chest pain, not associated with activity, left sided and occasional middle of the chest.  12/2015- Normal stress test. Unclear etiology of afib. Improved sxs of CP, SOB. Chest wall pain around the loop recorder. No episodes of afib on loop recorder.   5/2016- Has had recurrent pain around the loop recorder site. Was planned for removal but canceled appt. No further afib events.   6/2019- Stable. No chest pain. Loop recorder removed.   7/29/2020 Pt is here for routine f/u. Reports of feeling good. Got Covid 19 last March - stayed at home only. Has occasional epigastric pain. Denies anginal pain,  shortness of breath, fatigue, palpitations, syncope or near syncope, orthopnea and PND. No regular exercises, but plays frequently with her kids and does chores at home. No activity intolerance. No leg edema. Had labs from her PCP this month  8/2022- stable. no chest pain. Occasional fatigue. No palpitations. No syncope.   4/2024- Went to [facility] with symptoms of dizziness. Worked up and negative. Pregnant 23 weeks. No syncope.

## 2024-04-16 NOTE — DISCUSSION/SUMMARY
[FreeTextEntry1] : 1. Afib: No further events. Doing well. Loop removed a few years prior. Recurrent dizziness but lasted for 15 minutes. Will place monitor for 5 days. Holter placed in monitor today. ? of Dehydration.  2. Continue aspirin.  3. Advised to partake in at least 150 mins/week of moderate intensity exercise like brisk walking. 4. Follow up 2-3 months.    [EKG obtained to assist in diagnosis and management of assessed problem(s)] : EKG obtained to assist in diagnosis and management of assessed problem(s)

## 2024-05-19 ENCOUNTER — NON-APPOINTMENT (OUTPATIENT)
Age: 39
End: 2024-05-19

## 2024-06-10 ENCOUNTER — APPOINTMENT (OUTPATIENT)
Dept: ANTEPARTUM | Facility: CLINIC | Age: 39
End: 2024-06-10
Payer: MEDICAID

## 2024-06-10 ENCOUNTER — ASOB RESULT (OUTPATIENT)
Age: 39
End: 2024-06-10

## 2024-06-10 PROCEDURE — 76819 FETAL BIOPHYS PROFIL W/O NST: CPT

## 2024-06-10 PROCEDURE — 76816 OB US FOLLOW-UP PER FETUS: CPT

## 2024-06-25 ENCOUNTER — APPOINTMENT (OUTPATIENT)
Dept: CARDIOLOGY | Facility: CLINIC | Age: 39
End: 2024-06-25
Payer: MEDICAID

## 2024-06-25 VITALS
HEIGHT: 68 IN | WEIGHT: 219 LBS | OXYGEN SATURATION: 97 % | HEART RATE: 73 BPM | DIASTOLIC BLOOD PRESSURE: 80 MMHG | BODY MASS INDEX: 33.19 KG/M2 | SYSTOLIC BLOOD PRESSURE: 122 MMHG

## 2024-06-25 DIAGNOSIS — I48.91 UNSPECIFIED ATRIAL FIBRILLATION: ICD-10-CM

## 2024-06-25 PROCEDURE — 93000 ELECTROCARDIOGRAM COMPLETE: CPT

## 2024-06-25 PROCEDURE — 99212 OFFICE O/P EST SF 10 MIN: CPT | Mod: 25

## 2024-07-09 ENCOUNTER — APPOINTMENT (OUTPATIENT)
Dept: ANTEPARTUM | Facility: CLINIC | Age: 39
End: 2024-07-09
Payer: MEDICAID

## 2024-07-09 ENCOUNTER — ASOB RESULT (OUTPATIENT)
Age: 39
End: 2024-07-09

## 2024-07-09 PROCEDURE — 76816 OB US FOLLOW-UP PER FETUS: CPT

## 2024-07-09 PROCEDURE — 76819 FETAL BIOPHYS PROFIL W/O NST: CPT

## 2024-07-25 ENCOUNTER — INPATIENT (INPATIENT)
Facility: HOSPITAL | Age: 39
LOS: 2 days | Discharge: HOME | End: 2024-07-28
Attending: SPECIALIST | Admitting: SPECIALIST
Payer: MEDICAID

## 2024-07-25 VITALS — HEART RATE: 77 BPM | SYSTOLIC BLOOD PRESSURE: 140 MMHG | DIASTOLIC BLOOD PRESSURE: 80 MMHG

## 2024-07-25 DIAGNOSIS — O26.899 OTHER SPECIFIED PREGNANCY RELATED CONDITIONS, UNSPECIFIED TRIMESTER: ICD-10-CM

## 2024-07-25 DIAGNOSIS — Z95.818 PRESENCE OF OTHER CARDIAC IMPLANTS AND GRAFTS: Chronic | ICD-10-CM

## 2024-07-25 DIAGNOSIS — O26.893 OTHER SPECIFIED PREGNANCY RELATED CONDITIONS, THIRD TRIMESTER: ICD-10-CM

## 2024-07-25 LAB
ALBUMIN SERPL ELPH-MCNC: 3.5 G/DL — SIGNIFICANT CHANGE UP (ref 3.3–5.2)
ALP SERPL-CCNC: 170 U/L — HIGH (ref 40–120)
ALT FLD-CCNC: 16 U/L — SIGNIFICANT CHANGE UP
ANION GAP SERPL CALC-SCNC: 16 MMOL/L — SIGNIFICANT CHANGE UP (ref 5–17)
APPEARANCE UR: ABNORMAL
APTT BLD: 25.6 SEC — SIGNIFICANT CHANGE UP (ref 24.5–35.6)
AST SERPL-CCNC: 18 U/L — SIGNIFICANT CHANGE UP
BACTERIA # UR AUTO: NEGATIVE /HPF — SIGNIFICANT CHANGE UP
BASOPHILS # BLD AUTO: 0.03 K/UL — SIGNIFICANT CHANGE UP (ref 0–0.2)
BASOPHILS NFR BLD AUTO: 0.3 % — SIGNIFICANT CHANGE UP (ref 0–2)
BILIRUB SERPL-MCNC: 0.2 MG/DL — LOW (ref 0.4–2)
BILIRUB UR-MCNC: NEGATIVE — SIGNIFICANT CHANGE UP
BLD GP AB SCN SERPL QL: SIGNIFICANT CHANGE UP
BUN SERPL-MCNC: 6.4 MG/DL — LOW (ref 8–20)
CALCIUM SERPL-MCNC: 8.8 MG/DL — SIGNIFICANT CHANGE UP (ref 8.4–10.5)
CHLORIDE SERPL-SCNC: 101 MMOL/L — SIGNIFICANT CHANGE UP (ref 96–108)
CO2 SERPL-SCNC: 19 MMOL/L — LOW (ref 22–29)
COLOR SPEC: YELLOW — SIGNIFICANT CHANGE UP
CREAT ?TM UR-MCNC: 75 MG/DL — SIGNIFICANT CHANGE UP
CREAT SERPL-MCNC: 0.55 MG/DL — SIGNIFICANT CHANGE UP (ref 0.5–1.3)
DIFF PNL FLD: NEGATIVE — SIGNIFICANT CHANGE UP
EGFR: 122 ML/MIN/1.73M2 — SIGNIFICANT CHANGE UP
EOSINOPHIL # BLD AUTO: 0.01 K/UL — SIGNIFICANT CHANGE UP (ref 0–0.5)
EOSINOPHIL NFR BLD AUTO: 0.1 % — SIGNIFICANT CHANGE UP (ref 0–6)
FIBRINOGEN PPP-MCNC: 677 MG/DL — HIGH (ref 200–450)
GLUCOSE SERPL-MCNC: 60 MG/DL — LOW (ref 70–99)
GLUCOSE UR QL: NEGATIVE MG/DL — SIGNIFICANT CHANGE UP
HCT VFR BLD CALC: 33.8 % — LOW (ref 34.5–45)
HGB BLD-MCNC: 10.8 G/DL — LOW (ref 11.5–15.5)
HIV 1 & 2 AB SERPL IA.RAPID: SIGNIFICANT CHANGE UP
IMM GRANULOCYTES NFR BLD AUTO: 0.5 % — SIGNIFICANT CHANGE UP (ref 0–0.9)
INR BLD: 0.94 RATIO — SIGNIFICANT CHANGE UP (ref 0.85–1.18)
KETONES UR-MCNC: 80 MG/DL
LDH SERPL L TO P-CCNC: 161 U/L — SIGNIFICANT CHANGE UP (ref 98–192)
LEUKOCYTE ESTERASE UR-ACNC: ABNORMAL
LYMPHOCYTES # BLD AUTO: 1.49 K/UL — SIGNIFICANT CHANGE UP (ref 1–3.3)
LYMPHOCYTES # BLD AUTO: 14.7 % — SIGNIFICANT CHANGE UP (ref 13–44)
MCHC RBC-ENTMCNC: 24.9 PG — LOW (ref 27–34)
MCHC RBC-ENTMCNC: 32 GM/DL — SIGNIFICANT CHANGE UP (ref 32–36)
MCV RBC AUTO: 77.9 FL — LOW (ref 80–100)
MONOCYTES # BLD AUTO: 0.61 K/UL — SIGNIFICANT CHANGE UP (ref 0–0.9)
MONOCYTES NFR BLD AUTO: 6 % — SIGNIFICANT CHANGE UP (ref 2–14)
NEUTROPHILS # BLD AUTO: 7.93 K/UL — HIGH (ref 1.8–7.4)
NEUTROPHILS NFR BLD AUTO: 78.4 % — HIGH (ref 43–77)
NITRITE UR-MCNC: NEGATIVE — SIGNIFICANT CHANGE UP
PH UR: 6.5 — SIGNIFICANT CHANGE UP (ref 5–8)
PLATELET # BLD AUTO: 209 K/UL — SIGNIFICANT CHANGE UP (ref 150–400)
POTASSIUM SERPL-MCNC: 4.1 MMOL/L — SIGNIFICANT CHANGE UP (ref 3.5–5.3)
POTASSIUM SERPL-SCNC: 4.1 MMOL/L — SIGNIFICANT CHANGE UP (ref 3.5–5.3)
PROT ?TM UR-MCNC: 9 MG/DL — SIGNIFICANT CHANGE UP (ref 0–12)
PROT SERPL-MCNC: 7.2 G/DL — SIGNIFICANT CHANGE UP (ref 6.6–8.7)
PROT UR-MCNC: NEGATIVE MG/DL — SIGNIFICANT CHANGE UP
PROT/CREAT UR-RTO: 0.1 RATIO — SIGNIFICANT CHANGE UP
PROTHROM AB SERPL-ACNC: 10.4 SEC — SIGNIFICANT CHANGE UP (ref 9.5–13)
RBC # BLD: 4.34 M/UL — SIGNIFICANT CHANGE UP (ref 3.8–5.2)
RBC # FLD: 17.9 % — HIGH (ref 10.3–14.5)
RBC CASTS # UR COMP ASSIST: 1 /HPF — SIGNIFICANT CHANGE UP (ref 0–4)
SODIUM SERPL-SCNC: 136 MMOL/L — SIGNIFICANT CHANGE UP (ref 135–145)
SP GR SPEC: 1.01 — SIGNIFICANT CHANGE UP (ref 1–1.03)
SQUAMOUS # UR AUTO: 2 /HPF — SIGNIFICANT CHANGE UP (ref 0–5)
URATE SERPL-MCNC: 3.4 MG/DL — SIGNIFICANT CHANGE UP (ref 2.4–5.7)
UROBILINOGEN FLD QL: 1 MG/DL — SIGNIFICANT CHANGE UP (ref 0.2–1)
WBC # BLD: 10.12 K/UL — SIGNIFICANT CHANGE UP (ref 3.8–10.5)
WBC # FLD AUTO: 10.12 K/UL — SIGNIFICANT CHANGE UP (ref 3.8–10.5)
WBC UR QL: 2 /HPF — SIGNIFICANT CHANGE UP (ref 0–5)

## 2024-07-25 PROCEDURE — 88307 TISSUE EXAM BY PATHOLOGIST: CPT | Mod: 26

## 2024-07-25 RX ORDER — CEFAZOLIN SODIUM 10 G
VIAL (EA) INJECTION
Refills: 0 | Status: DISCONTINUED | OUTPATIENT
Start: 2024-07-25 | End: 2024-07-25

## 2024-07-25 RX ORDER — CEFAZOLIN SODIUM 10 G
VIAL (EA) INJECTION
Refills: 0 | Status: DISCONTINUED | OUTPATIENT
Start: 2024-07-25 | End: 2024-07-27

## 2024-07-25 RX ORDER — TRISODIUM CITRATE DIHYDRATE AND CITRIC ACID MONOHYDRATE 500; 334 MG/5ML; MG/5ML
15 SOLUTION ORAL EVERY 6 HOURS
Refills: 0 | Status: DISCONTINUED | OUTPATIENT
Start: 2024-07-25 | End: 2024-07-26

## 2024-07-25 RX ORDER — CHLORHEXIDINE GLUCONATE 500 MG/1
1 CLOTH TOPICAL DAILY
Refills: 0 | Status: DISCONTINUED | OUTPATIENT
Start: 2024-07-25 | End: 2024-07-26

## 2024-07-25 RX ORDER — OXYTOCIN/RINGER'S LACTATE 20/1000 ML
333.33 PLASTIC BAG, INJECTION (ML) INTRAVENOUS
Qty: 20 | Refills: 0 | Status: COMPLETED | OUTPATIENT
Start: 2024-07-25 | End: 2024-07-25

## 2024-07-25 RX ORDER — MAGNESIUM SULFATE 500 MG/ML
4 VIAL (ML) INJECTION ONCE
Refills: 0 | Status: COMPLETED | OUTPATIENT
Start: 2024-07-25 | End: 2024-07-26

## 2024-07-25 RX ORDER — OXYTOCIN/RINGER'S LACTATE 20/1000 ML
2 PLASTIC BAG, INJECTION (ML) INTRAVENOUS
Qty: 30 | Refills: 0 | Status: DISCONTINUED | OUTPATIENT
Start: 2024-07-25 | End: 2024-07-28

## 2024-07-25 RX ORDER — CEFAZOLIN SODIUM 10 G
1000 VIAL (EA) INJECTION EVERY 8 HOURS
Refills: 0 | Status: DISCONTINUED | OUTPATIENT
Start: 2024-07-26 | End: 2024-07-27

## 2024-07-25 RX ORDER — MAGNESIUM SULFATE 500 MG/ML
2 VIAL (ML) INJECTION
Qty: 40 | Refills: 0 | Status: DISCONTINUED | OUTPATIENT
Start: 2024-07-25 | End: 2024-07-26

## 2024-07-25 RX ORDER — CEFAZOLIN SODIUM 10 G
2000 VIAL (EA) INJECTION ONCE
Refills: 0 | Status: COMPLETED | OUTPATIENT
Start: 2024-07-25 | End: 2024-07-25

## 2024-07-25 RX ORDER — DEXTROSE MONOHYDRATE, SODIUM CHLORIDE, SODIUM LACTATE, CALCIUM CHLORIDE, MAGNESIUM CHLORIDE 1.5; 538; 448; 18.4; 5.08 G/100ML; MG/100ML; MG/100ML; MG/100ML; MG/100ML
1000 SOLUTION INTRAPERITONEAL
Refills: 0 | Status: DISCONTINUED | OUTPATIENT
Start: 2024-07-25 | End: 2024-07-26

## 2024-07-25 RX ADMIN — Medication 2 MILLIUNIT(S)/MIN: at 19:24

## 2024-07-25 RX ADMIN — Medication 2000 MILLIGRAM(S): at 19:24

## 2024-07-25 RX ADMIN — DEXTROSE MONOHYDRATE, SODIUM CHLORIDE, SODIUM LACTATE, CALCIUM CHLORIDE, MAGNESIUM CHLORIDE 125 MILLILITER(S): 1.5; 538; 448; 18.4; 5.08 SOLUTION INTRAPERITONEAL at 19:25

## 2024-07-25 NOTE — OB RN PATIENT PROFILE - AS SC BRADEN ACTIVITY
Lab work today.   Meds refilled today.     Consider colon cancer screening, breast cancer screening.        (4) walks frequently

## 2024-07-25 NOTE — OB PROVIDER H&P - NSLOWPPHRISK_OBGYN_A_OB
No previous uterine incision/Tesfaye Pregnancy/Less than or equal to 4 previous vaginal births/No known bleeding disorder/No history of postpartum hemorrhage/No other PPH risks indicated

## 2024-07-25 NOTE — OB PROVIDER H&P - HISTORY OF PRESENT ILLNESS
38y  at 38w1d GA by LMP consistent with second trimester sono who presented to L&D with an elevated BP reading of 130/92. Seen by her OB provider in clinic this morning - SVE was 0/80/-2, soft consistency of cervix.   Patient denies vaginal bleeding and leakage of fluid. She endorses good fetal movement. Currently getting occasional abdominal pain/suprapubic pressure - described as mild contractions.  Denies fevers, chills, nausea, vomiting, chest pain, SOB, dizziness and headache. No other complaints at this time.     NOA: Aug-  LMP:   Prenatal course is significant for:  Prenatal course uncomplicated.      POB:  PGYN: -fibroids, -ovarian cysts, denies STD hx, denies abnormal PAPs   PMH: Denies  PSH: Denies  SH: Denies EtOH, tobacco and illicit drug use during this pregnancy; feels safe at home   Meds: PNVs  Allergies: NKDA    BMI:  Sono:  EFW:     T(C): --  HR: 85 (24 @ 16:23) (76 - 85)  BP: 145/88 (24 @ 16:23) (140/80 - 146/81)  RR: --  SpO2: --    Gen: NAD, well-appearing, AAOx3   Abd: Soft, gravid  Ext: non-tender, non-edematous  SSE:   SVE:    Bedside sono:  FHT:  [place]:       A/P:   -Admit to L&D  -Consent  -Admission labs  -NPO, except ice chips   -IV fluids  -Labor: Intact/*ROM. Latent/Active labor. Katherin *.   -Fetus: Cat I tracing. Continuous toco and fetal monitoring.   -GBS: Negative, no GBS ppx required   -Analgesia:     Discussed with Dr. Newton 38y  at 38w1d GA by LMP consistent with second trimester sono who presented to L&D with an elevated BP reading of 130/92. Seen by her OB provider in clinic this morning - SVE was 0/80/-2, soft consistency of cervix.   Patient denies vaginal bleeding and leakage of fluid. She endorses good fetal movement. Currently getting occasional abdominal pain/suprapubic pressure - described as mild contractions.  Denies fevers, chills, nausea, vomiting, chest pain, SOB, dizziness and headache. No other complaints at this time.     NOA: Aug-  LMP: OCT-    Prenatal course is significant for:  Mid-range BP levels from roughly 31 weeks of gestation. She was being seen weekly by her OB provider. Never needed a standing BP medication order.   Patient has had occasional headaches, mostly mild in nature and respond to Tylenol.         POB: - SAB @ 11wks in 2019.  X2-  and . No complications pre/postpartum.   PGYN: -fibroids, -ovarian cysts, denies STD hx, denies abnormal PAPs   PMH: Afib in . Was monitored closely. Has since resolved. Was recently seen in April by her Cardiologist 2/2 feeling disoriented. Full cardiac check-up done - nil of note.   PSH: Denies  SH: Denies EtOH, tobacco and illicit drug use during this pregnancy; feels safe at home   Meds: PNVs  Allergies: Penicillin - rash/hives    Sono: Vertex presentation, posterior    EFW: 2684g (33rd percentile) on 2024 ~ 3184g   BMI: 34.2     38y  at 38w1d GA by LMP consistent with second trimester sono who presented to L&D with an elevated BP reading of 130/92. Seen by her OB provider in clinic this morning - SVE  0/80/-2, soft consistency of cervix.   Patient denies vaginal bleeding and leakage of fluid. She endorses good fetal movement. Currently getting occasional abdominal pain/suprapubic pressure - described as mild contractions.  Denies fevers, chills, nausea, vomiting, chest pain, SOB, dizziness and headache. No other complaints at this time.     NOA: Aug-  LMP: OCT-    Prenatal course is significant for:  Mid-range BP levels from roughly 31 weeks of gestation. She was being seen weekly by her OB provider. Never needed a standing BP medication order.   Patient has had occasional headaches, mostly mild in nature and respond to Tylenol.         POB: - SAB @ 11wks in 2019.  X2-  and . No complications pre/postpartum.   PGYN: -fibroids, -ovarian cysts, denies STD hx, denies abnormal PAPs   PMH: Afib in . Was monitored closely. Has since resolved. Was recently seen in April by her Cardiologist 2/2 feeling disoriented. Full cardiac check-up done - nil of note.   PSH: Denies  SH: Denies EtOH, tobacco and illicit drug use during this pregnancy; feels safe at home   Meds: PNVs  Allergies: Penicillin - rash/hives    GBS- positive, no cultures present     Sono: Vertex presentation, posterior    EFW: 2684g (33rd percentile) on 2024 ~ 3184g   BMI: 34.2     38y  at 38w1d GA by LMP  who presented to L&D with an elevated BP reading of 130/92. Seen by her OB provider in clinic this morning - SVE  0/80/-2, soft consistency of cervix.   Patient denies vaginal bleeding and leakage of fluid. She endorses good fetal movement. Currently getting occasional abdominal pain/suprapubic pressure - described as mild contractions.  Denies fevers, chills, nausea, vomiting, chest pain, SOB, dizziness and headache. No other complaints at this time.     NOA: Aug-  LMP: OCT-    Prenatal course is significant for:  Mid-range BP levels from roughly 31 weeks of gestation. She was being seen weekly by her OB provider. Never needed a standing BP medication order.   Patient has had occasional headaches, mostly mild in nature and respond to Tylenol.         POB: - SAB @ 11wks in 2019.  X2-  and . No complications pre/postpartum.   PGYN: -fibroids, -ovarian cysts, denies STD hx, denies abnormal PAPs   PMH: Afib in . Was monitored closely. Has since resolved. Was recently seen in April by her Cardiologist 2/2 feeling disoriented. Full cardiac check-up done - nil of note.   PSH: Denies  SH: Denies EtOH, tobacco and illicit drug use during this pregnancy; feels safe at home   Meds: PNVs  Allergies: Penicillin - rash/hives    GBS- positive, no cultures present     Sono: Vertex presentation, posterior    EFW: 2684g (33rd percentile) on 2024 ~ 3184g   BMI: 34.2

## 2024-07-25 NOTE — OB PROVIDER H&P - NSHPPHYSICALEXAM_GEN_ALL_CORE
T(C): --  HR: 85 (07-25-24 @ 16:23) (76 - 85)  BP: 145/88 (07-25-24 @ 16:23) (140/80 - 146/81)  RR: 17  SpO2: 99    Gen: NAD, well-appearing, AAOx3   Abd: Soft, gravid  Ext: non-tender, non-edematous  SVE:  0/80/-2 @ 1pm  Bedside sono: vertex presentation  FHT: 140bpm, moderate variability, accels, no decels  [employer]: Regular CTX, q7- patient currently only experiencing mild pain/pressure T(C): --  HR: 85 (07-25-24 @ 16:23) (76 - 85)  BP: 145/88 (07-25-24 @ 16:23) (140/80 - 146/81)  RR: 17  SpO2: 99    Gen: NAD, well-appearing, AAOx3   Abd: Soft, gravid  Ext: non-tender, non-edematous  SVE:  0/80/-2 @ 1pm by Dr Pizano  Bedside sono: vertex presentation  FHT: 140bpm, moderate variability, accels, no decels  [place]: Regular CTX, q7- patient currently only experiencing mild pain/pressure

## 2024-07-25 NOTE — OB PROVIDER H&P - ASSESSMENT
A/P: 38y  at 38w1d for IOL 2/2 gHTN    -Admit to L&D  -Consent  -Admission labs  -NPO, except ice chips   -IV fluids  -Labor: Intact. Latent labor. Katherin q7  -Fetus: Cat I tracing. Continuous toco and fetal monitoring.   -GBS: Negative, no GBS ppx required   -Analgesia: Epidural prn  -As per Dr TERRY Pizano: Start patient on Pitocin and insert Cook Balloon    Discussed with Dr. TERRY Pizano A/P: 38y  at 38w1d for IOL 2/2 gHTN    -Admit to L&D  -Consent  -Admission labs  -IV fluids  -Labor: Intact. Latent labor. Katherin q7  -Fetus: Cat I tracing. Continuous toco and fetal monitoring.   -GBS: Positive GBSx; Abx required  -PIH labs ordered   -Hep C Ab and Rubeola IgG ordered   -Analgesia: Epidural prn  -As per Dr TERRY Pizano: Start patient on Pitocin and insert Cook Balloon    Discussed with Dr. TERRY Pizano A/P: 38y  at 38w1d for IOL 2/2 gHTN    -Admit to L&D  -Consent  -Admission labs  -IV fluids  -Labor: Intact. Latent labor. Katherin q7  -Fetus: Cat I tracing. Continuous toco and fetal monitoring.   -GBS: Positive GBSx; Penicillin allergy-rash. Discussed with Dr Pizano. Cefazolin prescribed.  -PIH labs ordered   -Hep C Ab and Rubeola IgG ordered   -Analgesia: Epidural prn  -As per Dr TERRY Pizano: Start patient on Pitocin and insert Cook Balloon    Discussed with Dr. TERRY Pizano

## 2024-07-25 NOTE — OB PROVIDER H&P - ATTENDING COMMENTS
Pt is a 38y  at 38w3d by LMP for IOL 2/2 gHTN  Pt was seen in her primary OB Dr Pizano's office and had documented elevated bp of 130/92 at 11am today  When patient arrived on L+D bps mostly 140s/90s, meeting criteria for dx of gestational HTN with her documented elevated bp at 11 am as above.  Pt denies any headache, visual changes or epigastric discomfort.    Plan:  Admit to L+D  Per Dr Pizano, pt to be started on pitocin for IOL for gHTN (VE by Dr Pizano in office 0/80%)  Analgesia prn  PEC labs sent, results pending  Continue close monitoring of her blood pressures at this time  Will place CB when able   Informed consent obtained and plan for IOL d/w pt.  All questions answered.    Pt will accept blood products if needed.    FARHAT Fitzgerald (OB hospitalist) Pt is a 38y  at 38w3d by LMP for IOL 2/2 gHTN  Pt was seen in her primary OB Dr Pizano's office and had documented elevated bp of 130/92 at 11am today  When patient arrived on L+D bps mostly 140s/90s, meeting criteria for dx of gestational HTN with her documented elevated bp at 11 am as above.  Pt denies any headache, visual changes or epigastric discomfort.    Plan:  Admit to L+D  Per Dr Pizano, pt to be started on pitocin for IOL for gHTN (VE by Dr Pizano in office 0/80%)  Analgesia prn  PEC labs sent, results pending  Continue close monitoring of her blood pressures at this time  Will place CB when able   Per Dr Pizano, pt is GBS positive (no hard copy available) pt reports PCN allergy (hives), low risk for anaphylaxis will start  for GBS prophylaxis with cefazolin.  Informed consent obtained and plan for IOL d/w pt.  All questions answered.    Pt will accept blood products if needed.    FARHAT Fitzgerald (OB hospitalist)

## 2024-07-26 LAB
HCT VFR BLD CALC: 32.5 % — LOW (ref 34.5–45)
HCV AB S/CO SERPL IA: 0.08 S/CO — SIGNIFICANT CHANGE UP (ref 0–0.99)
HCV AB SERPL-IMP: SIGNIFICANT CHANGE UP
HGB BLD-MCNC: 10.5 G/DL — LOW (ref 11.5–15.5)
HIV 1+2 AB+HIV1 P24 AG SERPL QL IA: SIGNIFICANT CHANGE UP
MAGNESIUM SERPL-MCNC: 4.5 MG/DL — HIGH (ref 1.8–2.6)
MAGNESIUM SERPL-MCNC: 4.6 MG/DL — HIGH (ref 1.6–2.6)
MAGNESIUM SERPL-MCNC: 4.6 MG/DL — HIGH (ref 1.6–2.6)
MCHC RBC-ENTMCNC: 25 PG — LOW (ref 27–34)
MCHC RBC-ENTMCNC: 32.3 GM/DL — SIGNIFICANT CHANGE UP (ref 32–36)
MCV RBC AUTO: 77.4 FL — LOW (ref 80–100)
MEV IGG SER-ACNC: >300 AU/ML — SIGNIFICANT CHANGE UP
MEV IGG+IGM SER-IMP: POSITIVE — SIGNIFICANT CHANGE UP
PLATELET # BLD AUTO: 201 K/UL — SIGNIFICANT CHANGE UP (ref 150–400)
RBC # BLD: 4.2 M/UL — SIGNIFICANT CHANGE UP (ref 3.8–5.2)
RBC # FLD: 17.8 % — HIGH (ref 10.3–14.5)
T PALLIDUM AB TITR SER: NEGATIVE — SIGNIFICANT CHANGE UP
WBC # BLD: 10.31 K/UL — SIGNIFICANT CHANGE UP (ref 3.8–10.5)
WBC # FLD AUTO: 10.31 K/UL — SIGNIFICANT CHANGE UP (ref 3.8–10.5)

## 2024-07-26 PROCEDURE — 93010 ELECTROCARDIOGRAM REPORT: CPT

## 2024-07-26 RX ORDER — CARBOPROST TROMETHAMINE 250 UG/ML
0.25 INJECTION, SOLUTION INTRAMUSCULAR ONCE
Refills: 0 | Status: COMPLETED | OUTPATIENT
Start: 2024-07-26 | End: 2024-07-26

## 2024-07-26 RX ORDER — IBUPROFEN 200 MG
600 TABLET ORAL EVERY 6 HOURS
Refills: 0 | Status: DISCONTINUED | OUTPATIENT
Start: 2024-07-26 | End: 2024-07-28

## 2024-07-26 RX ORDER — ACETAMINOPHEN 500 MG
975 TABLET ORAL
Refills: 0 | Status: DISCONTINUED | OUTPATIENT
Start: 2024-07-26 | End: 2024-07-28

## 2024-07-26 RX ORDER — ONDANSETRON HCL/PF 4 MG/2 ML
4 VIAL (ML) INJECTION ONCE
Refills: 0 | Status: COMPLETED | OUTPATIENT
Start: 2024-07-26 | End: 2024-07-26

## 2024-07-26 RX ORDER — DIPHENHYDRAMINE HCL 25 MG
25 CAPSULE ORAL EVERY 6 HOURS
Refills: 0 | Status: DISCONTINUED | OUTPATIENT
Start: 2024-07-26 | End: 2024-07-28

## 2024-07-26 RX ORDER — LANOLIN 100 %
1 OINTMENT (GRAM) TOPICAL EVERY 6 HOURS
Refills: 0 | Status: DISCONTINUED | OUTPATIENT
Start: 2024-07-26 | End: 2024-07-28

## 2024-07-26 RX ORDER — NIFEDIPINE 20 MG/1
30 CAPSULE, LIQUID FILLED ORAL DAILY
Refills: 0 | Status: DISCONTINUED | OUTPATIENT
Start: 2024-07-26 | End: 2024-07-27

## 2024-07-26 RX ORDER — IBUPROFEN 200 MG
600 TABLET ORAL EVERY 6 HOURS
Refills: 0 | Status: COMPLETED | OUTPATIENT
Start: 2024-07-26 | End: 2025-06-24

## 2024-07-26 RX ORDER — DIBUCAINE 1 %
1 OINTMENT (GRAM) TOPICAL EVERY 6 HOURS
Refills: 0 | Status: DISCONTINUED | OUTPATIENT
Start: 2024-07-26 | End: 2024-07-28

## 2024-07-26 RX ORDER — MAGNESIUM HYDROXIDE 400 MG/5ML
30 SUSPENSION, ORAL (FINAL DOSE FORM) ORAL
Refills: 0 | Status: DISCONTINUED | OUTPATIENT
Start: 2024-07-26 | End: 2024-07-28

## 2024-07-26 RX ORDER — OXYTOCIN/RINGER'S LACTATE 20/1000 ML
41.67 PLASTIC BAG, INJECTION (ML) INTRAVENOUS
Qty: 20 | Refills: 0 | Status: DISCONTINUED | OUTPATIENT
Start: 2024-07-26 | End: 2024-07-28

## 2024-07-26 RX ORDER — DIPHENOXYLATE HCL/ATROPINE 2.5-.025MG
1 TABLET ORAL ONCE
Refills: 0 | Status: DISCONTINUED | OUTPATIENT
Start: 2024-07-26 | End: 2024-07-26

## 2024-07-26 RX ORDER — CRANBERRY FRUIT EXTRACT 650 MG
1 CAPSULE ORAL DAILY
Refills: 0 | Status: DISCONTINUED | OUTPATIENT
Start: 2024-07-26 | End: 2024-07-28

## 2024-07-26 RX ORDER — KETOROLAC TROMETHAMINE 10 MG
30 TABLET ORAL ONCE
Refills: 0 | Status: DISCONTINUED | OUTPATIENT
Start: 2024-07-26 | End: 2024-07-26

## 2024-07-26 RX ORDER — HYDROCORTISONE 1 %
1 CREAM (GRAM) TOPICAL EVERY 6 HOURS
Refills: 0 | Status: DISCONTINUED | OUTPATIENT
Start: 2024-07-26 | End: 2024-07-28

## 2024-07-26 RX ORDER — BACTERIOSTATIC SODIUM CHLORIDE 0.9 %
3 VIAL (ML) INJECTION EVERY 8 HOURS
Refills: 0 | Status: DISCONTINUED | OUTPATIENT
Start: 2024-07-26 | End: 2024-07-28

## 2024-07-26 RX ORDER — SIMETHICONE 125 MG/1
80 TABLET, CHEWABLE ORAL EVERY 4 HOURS
Refills: 0 | Status: DISCONTINUED | OUTPATIENT
Start: 2024-07-26 | End: 2024-07-28

## 2024-07-26 RX ORDER — WITCH HAZEL 500 MG/1
1 CLOTH TOPICAL EVERY 4 HOURS
Refills: 0 | Status: DISCONTINUED | OUTPATIENT
Start: 2024-07-26 | End: 2024-07-28

## 2024-07-26 RX ORDER — CLOSTRIDIUM TETANI TOXOID ANTIGEN (FORMALDEHYDE INACTIVATED), CORYNEBACTERIUM DIPHTHERIAE TOXOID ANTIGEN (FORMALDEHYDE INACTIVATED), BORDETELLA PERTUSSIS TOXOID ANTIGEN (GLUTARALDEHYDE INACTIVATED), BORDETELLA PERTUSSIS FILAMENTOUS HEMAGGLUTININ ANTIGEN (FORMALDEHYDE INACTIVATED), BORDETELLA PERTUSSIS PERTACTIN ANTIGEN, AND BORDETELLA PERTUSSIS FIMBRIAE 2/3 ANTIGEN 5; 2; 2.5; 5; 3; 5 [LF]/.5ML; [LF]/.5ML; UG/.5ML; UG/.5ML; UG/.5ML; UG/.5ML
0.5 INJECTION, SUSPENSION INTRAMUSCULAR ONCE
Refills: 0 | Status: DISCONTINUED | OUTPATIENT
Start: 2024-07-26 | End: 2024-07-28

## 2024-07-26 RX ADMIN — CARBOPROST TROMETHAMINE 0.25 MICROGRAM(S): 250 INJECTION, SOLUTION INTRAMUSCULAR at 08:19

## 2024-07-26 RX ADMIN — NIFEDIPINE 30 MILLIGRAM(S): 20 CAPSULE, LIQUID FILLED ORAL at 19:37

## 2024-07-26 RX ADMIN — Medication 1 TABLET(S): at 20:44

## 2024-07-26 RX ADMIN — Medication 1000 MILLIUNIT(S)/MIN: at 17:44

## 2024-07-26 RX ADMIN — Medication 300 GRAM(S): at 00:01

## 2024-07-26 RX ADMIN — Medication 1000 MILLIGRAM(S): at 12:00

## 2024-07-26 RX ADMIN — DEXTROSE MONOHYDRATE, SODIUM CHLORIDE, SODIUM LACTATE, CALCIUM CHLORIDE, MAGNESIUM CHLORIDE 125 MILLILITER(S): 1.5; 538; 448; 18.4; 5.08 SOLUTION INTRAPERITONEAL at 11:34

## 2024-07-26 RX ADMIN — Medication 2 MILLIUNIT(S)/MIN: at 11:34

## 2024-07-26 RX ADMIN — Medication 30 MILLIGRAM(S): at 17:30

## 2024-07-26 RX ADMIN — Medication 50 GM/HR: at 00:22

## 2024-07-26 RX ADMIN — Medication 50 GM/HR: at 19:42

## 2024-07-26 RX ADMIN — Medication 50 GM/HR: at 11:34

## 2024-07-26 RX ADMIN — Medication 50 GM/HR: at 17:44

## 2024-07-26 RX ADMIN — DEXTROSE MONOHYDRATE, SODIUM CHLORIDE, SODIUM LACTATE, CALCIUM CHLORIDE, MAGNESIUM CHLORIDE 125 MILLILITER(S): 1.5; 538; 448; 18.4; 5.08 SOLUTION INTRAPERITONEAL at 05:31

## 2024-07-26 RX ADMIN — Medication 4 MILLIGRAM(S): at 05:31

## 2024-07-26 RX ADMIN — Medication 1000 MILLIGRAM(S): at 03:26

## 2024-07-26 NOTE — CHART NOTE - NSCHARTNOTEFT_GEN_A_CORE
Went to evaluate patient with severe range bps.    Bp as follows:  160/90 at 23:23  then 180/116 at 23:38.  meeting criteria for PEC w SF.  Pt states she has mild headache, denies any visual changes or epigastric pain.  Will start Magnesium sulfate for seizure prophylaxis.    Will continue close monitoring of her blood pressures.  NST: baseline 130, +accel, no decel, marked variability, reactive  [place]: Q3min  Explained to pt that she may need additional IVP medications if she develops hypertensive emergency.  Will continue pitocin at this time.  Pt still considering cervical balloon placement.  Above d/w pt and her partner in detail and all questions answered.    Dr Pizano aware of above.    FARHAT Fitzgerald (OB hospitalist)

## 2024-07-26 NOTE — CHART NOTE - NSCHARTNOTEFT_GEN_A_CORE
Provider informed that pt w increased bleeding postpartum.  Evaluated at bedside, pt comfortable and w/o complaints.    Vital Signs Last 24 Hrs  T(C): 36.6 (26 Jul 2024 19:35), Max: 37.1 (25 Jul 2024 21:47)  T(F): 97.9 (26 Jul 2024 19:35), Max: 98.8 (26 Jul 2024 16:49)  HR: 82 (26 Jul 2024 20:03) (62 - 108)  BP: 141/90 (26 Jul 2024 20:03) (116/91 - 186/95)  RR: 18 (26 Jul 2024 18:33) (12 - 20)  SpO2: 100% (26 Jul 2024 16:32) (94% - 100%)    Gen: well-appearing, NAD  Neuro: A&Ox3  Resp: breathing comfortably on RA  Abd: fundus firm at umbilicus  VE: 100cc clots expressed from uterus, no ongoing active bleeding    A/P PPD#0, PECwSFoMg  - vital signs w moderate hypertension, on Mg for PECwSF; start procardia 30, continue to monitor BPs  - s/p rcyto; will order hemabate   - continue to monitor lochia  - routine postpartum care

## 2024-07-26 NOTE — OB PROVIDER DELIVERY SUMMARY - NSSELHIDDEN_OBGYN_ALL_OB_FT
[NS_DeliveryAttending1_OBGYN_ALL_OB_FT:[id number]],[NS_DeliveryAssist1_OBGYN_ALL_OB_FT:[id number]]

## 2024-07-26 NOTE — OB RN DELIVERY SUMMARY - NSSELHIDDEN_OBGYN_ALL_OB_FT
[NS_DeliveryAttending1_OBGYN_ALL_OB_FT:[id number]],[NS_DeliveryAssist1_OBGYN_ALL_OB_FT:[id number]],[NS_DeliveryRN_OBGYN_ALL_OB_FT:[id number]=]

## 2024-07-26 NOTE — OB NEONATOLOGY/PEDIATRICIAN DELIVERY SUMMARY - NSPEDSNEONOTESA_OBGYN_ALL_OB_FT
Baby Dc Umana born at 38.4 via  to a 39yo  O+, Hep B neg, HIV NR, RPR NR, Rubella NI, GBS positive mother. No significant maternal hx. Prenatal course c/w sPEC on MgSO4. AROM clear fluid <1h prior to delivery. Highest maternal temp 37.1. Baby emerged vigorous and crying. Delayed cord clamping 30s. Warmed, dried, suctioned, stimulated. Apgar 9/9. Admit to  nursery. EOS 0.15

## 2024-07-26 NOTE — OB RN DELIVERY SUMMARY - NS_SEPSISRSKCALC_OBGYN_ALL_OB_FT
EOS calculated successfully. EOS Risk Factor: 0.5/1000 live births ([facility] national incidence); GA=38w4d; Temp=98.78; ROM=0.083; GBS='Positive'; Antibiotics='Broad spectrum antibiotics > 4 hrs prior to birth'

## 2024-07-26 NOTE — OB RN DELIVERY SUMMARY - BABY A: APGAR 5 MIN COLOR, DELIVERY
-Start on ISS, continue home lantus 30 mg, maintain hypoglycemic protocol (1) body pink, extremities blue

## 2024-07-26 NOTE — OB PROVIDER LABOR PROGRESS NOTE - ASSESSMENT
FHRT reassuring  continue pitocin   BPs stable
anticipate vaginal delivery  FHRT reassuring  will reassess when more pressure 
- maternal VSS  - EFM needs to be adjusted, patient going to the bathroom  - continue to monitor labor progress 
FHRT category I   membranes intact   /82 > repeat 130/74   No episodes of severe range pressures within the last hour  Continue current management and close BP monitoring

## 2024-07-26 NOTE — OB PROVIDER DELIVERY SUMMARY - NSPROVIDERDELIVERYNOTE_OBGYN_ALL_OB_FT
Procedure: Normal spontaneous vaginal delivery   Findings: Viable male infant delivered in cephalic presentation at 1635, placenta delivered at 1638.  Apgar scores 9/9   Weight: 2790g  Laceration(s): 1st degree perineal  EBL: 225ml  Complications: loose nuchal cord x 1    Procedure:   Patient felt rectal pressure and was found to be fully dilated, +2 station. She pushed effectively for 5 minutes. She delivered a viable male infant. A loose nuchal cord X 1 was reduced on delivery of the shoulders and delayed cord clamping was performed for 30 seconds. Placenta delivered intact and pitocin was started at the delivery of placenta. Perineum and vagina were inspected, 1st degree laceration present. Adequate hemostasis was obtained.

## 2024-07-26 NOTE — OB PROVIDER LABOR PROGRESS NOTE - NS_SUBJECTIVE/OBJECTIVE_OBGYN_ALL_OB_FT
comfortable after epidural
patient complains of pressure
patient is doing well, no complaints at bedside     Vital Signs Last 24 Hrs  T(C): 37.0 (26 Jul 2024 00:50), Max: 37.1 (25 Jul 2024 21:47)  T(F): 98.6 (26 Jul 2024 00:50), Max: 98.78 (25 Jul 2024 21:47)  HR: 80 (26 Jul 2024 02:55) (70 - 109)  BP: 142/87 (26 Jul 2024 02:55) (124/73 - 180/116)  RR: 12 (26 Jul 2024 00:50) (12 - 18)      Parameters below as of 25 Jul 2024 16:47  Patient On (Oxygen Delivery Method): room air

## 2024-07-26 NOTE — CHART NOTE - NSCHARTNOTEFT_GEN_A_CORE
Patient with severe /97 immediately after vaginal exam  Repeat 5 minutes later 141/84   No interventions at this time  will continue to cycle BPs as per protocol

## 2024-07-26 NOTE — OB PROVIDER LABOR PROGRESS NOTE - NS_OBIHIFHRDETAILS_OBGYN_ALL_OB_FT
discontinuous, EFM needs to be readjusted
category I
110, moderate variability, + accels, - decels
category I

## 2024-07-27 LAB
ALBUMIN SERPL ELPH-MCNC: 2.7 G/DL — LOW (ref 3.3–5.2)
ALP SERPL-CCNC: 123 U/L — HIGH (ref 40–120)
ALT FLD-CCNC: 11 U/L — SIGNIFICANT CHANGE UP
ANION GAP SERPL CALC-SCNC: 8 MMOL/L — SIGNIFICANT CHANGE UP (ref 5–17)
AST SERPL-CCNC: 20 U/L — SIGNIFICANT CHANGE UP
BILIRUB SERPL-MCNC: 0.3 MG/DL — LOW (ref 0.4–2)
BUN SERPL-MCNC: 5.4 MG/DL — LOW (ref 8–20)
CALCIUM SERPL-MCNC: 6.8 MG/DL — LOW (ref 8.4–10.5)
CHLORIDE SERPL-SCNC: 103 MMOL/L — SIGNIFICANT CHANGE UP (ref 96–108)
CO2 SERPL-SCNC: 22 MMOL/L — SIGNIFICANT CHANGE UP (ref 22–29)
CREAT SERPL-MCNC: 0.52 MG/DL — SIGNIFICANT CHANGE UP (ref 0.5–1.3)
EGFR: 123 ML/MIN/1.73M2 — SIGNIFICANT CHANGE UP
GLUCOSE SERPL-MCNC: 92 MG/DL — SIGNIFICANT CHANGE UP (ref 70–99)
HCT VFR BLD CALC: 27 % — LOW (ref 34.5–45)
HGB BLD-MCNC: 8.6 G/DL — LOW (ref 11.5–15.5)
MAGNESIUM SERPL-MCNC: 4.9 MG/DL — HIGH (ref 1.8–2.6)
MCHC RBC-ENTMCNC: 24.9 PG — LOW (ref 27–34)
MCHC RBC-ENTMCNC: 31.9 GM/DL — LOW (ref 32–36)
MCV RBC AUTO: 78.3 FL — LOW (ref 80–100)
PLATELET # BLD AUTO: 175 K/UL — SIGNIFICANT CHANGE UP (ref 150–400)
POTASSIUM SERPL-MCNC: 4.1 MMOL/L — SIGNIFICANT CHANGE UP (ref 3.5–5.3)
POTASSIUM SERPL-SCNC: 4.1 MMOL/L — SIGNIFICANT CHANGE UP (ref 3.5–5.3)
PROT SERPL-MCNC: 5.5 G/DL — LOW (ref 6.6–8.7)
RBC # BLD: 3.45 M/UL — LOW (ref 3.8–5.2)
RBC # FLD: 17.9 % — HIGH (ref 10.3–14.5)
SODIUM SERPL-SCNC: 133 MMOL/L — LOW (ref 135–145)
WBC # BLD: 11.42 K/UL — HIGH (ref 3.8–10.5)
WBC # FLD AUTO: 11.42 K/UL — HIGH (ref 3.8–10.5)

## 2024-07-27 RX ORDER — MAGNESIUM SULFATE 500 MG/ML
2 VIAL (ML) INJECTION
Qty: 40 | Refills: 0 | Status: DISCONTINUED | OUTPATIENT
Start: 2024-07-27 | End: 2024-07-28

## 2024-07-27 RX ORDER — NIFEDIPINE 20 MG/1
30 CAPSULE, LIQUID FILLED ORAL DAILY
Refills: 0 | Status: DISCONTINUED | OUTPATIENT
Start: 2024-07-27 | End: 2024-07-28

## 2024-07-27 RX ADMIN — Medication 975 MILLIGRAM(S): at 21:19

## 2024-07-27 RX ADMIN — Medication 600 MILLIGRAM(S): at 11:11

## 2024-07-27 RX ADMIN — Medication 3 MILLILITER(S): at 16:35

## 2024-07-27 RX ADMIN — NIFEDIPINE 30 MILLIGRAM(S): 20 CAPSULE, LIQUID FILLED ORAL at 11:11

## 2024-07-27 RX ADMIN — Medication 1 TABLET(S): at 11:11

## 2024-07-27 NOTE — PROGRESS NOTE ADULT - ASSESSMENT
A/P:  38y  now PPD1 s/p spontaneous vaginal delivery at 38w1d gestation, 2/2 gHTN. PECwSF, on Magnesium.    -Vital signs stable  -Hgb: 10.5 -> AM labs pending   -Voiding, tolerating PO, bowel function nml   -Advance care as tolerated   -Continue routine postpartum and postoperative care and education  -Healthy male infant, declines circumcision  -Dispo: Continue inpatient care

## 2024-07-27 NOTE — PROGRESS NOTE ADULT - SUBJECTIVE AND OBJECTIVE BOX
S: Patient doing well. Minimal lochia. No complaints.    O: Vital Signs Last 24 Hrs  T(C): 36.7 (27 Jul 2024 06:00), Max: 37.1 (26 Jul 2024 16:49)  T(F): 98 (27 Jul 2024 06:00), Max: 98.8 (26 Jul 2024 16:49)  HR: 77 (27 Jul 2024 06:00) (62 - 108)  BP: 99/58 (27 Jul 2024 06:00) (99/58 - 176/82)  BP(mean): --  RR: 16 (27 Jul 2024 06:00) (16 - 20)  SpO2: 97% (27 Jul 2024 06:00) (94% - 100%)    Parameters below as of 27 Jul 2024 04:00  Patient On (Oxygen Delivery Method): room air        Gen: NAD  Abd: soft, NT, ND, fundus firm below umbilicus  Ext: no tenderness    Labs:                        10.5   10.31 )-----------( 201      ( 26 Jul 2024 08:40 )             32.5          A/P PP # 1  Doing well.  Routine post partum care.  Discharge home in AM.       CHONG COTTON is a 38y  now PPD1 s/p spontaneous vaginal delivery at 38w1d gestation, 2/ gHTN. PECwSF, on Magnesium. s/p rCyto and Hemabate    S:    No acute events overnight.   The patient has no complaints.  Pain controlled with current treatment regimen.   She is ambulating without difficulty and tolerating PO.   + flatus/+BM/+ voiding   She endorses appropriate lochia, which is decreasing.   She denies fevers, chills, nausea and vomiting.   She denies lightheadedness, dizziness, palpitations, chest pain and SOB.     O:    T(C): 36.7 (24 @ 06:00), Max: 37.1 (24 @ 16:49)  HR: 77 (24 @ 06:00) (62 - 108)  BP: 99/58 (24 @ 06:00) (99/58 - 176/82)  RR: 16 (24 @ 06:00) (16 - 20)  SpO2: 97% (24 @ 06:00) (94% - 100%)    Gen: NAD, AOx3  Pulm: Resting comfortable on room air   Abdomen:  Soft, non-tender, non-distended  Uterus:  Fundus firm below umbilicus  VE:  Expectant lochia  Ext:  Non-tender and non-edematous                          10.5   10.31 )-----------( 201      ( 2024 08:40 )             32.5         136  |  101  |  6.4<L>  ----------------------------<  60<L>  4.1   |  19.0<L>  |  0.55    Ca    8.8      2024 18:00  Mg     4.9         TPro  7.2  /  Alb  3.5  /  TBili  0.2<L>  /  DBili  x   /  AST  18  /  ALT  16  /  AlkPhos  170<H>

## 2024-07-27 NOTE — PROGRESS NOTE ADULT - ASSESSMENT
A/P: 38y  now PPD1 s/p spontaneous vaginal delivery at 38W1D gestation, 2/2 gHTN, PECwSF, on Magnesium     -Vital signs stable  -Hgb: 10.5 -> AM labs pending   -Voiding, tolerating PO, bowel function nml   -Advance care as tolerated   -Continue routine postpartum care and education  -Healthy male infant, declines circumcision  -Dispo: Continue inpatient management

## 2024-07-27 NOTE — PROGRESS NOTE ADULT - SUBJECTIVE AND OBJECTIVE BOX
CHONG COTTON is a 38y  now PPD1 s/p spontaneous vaginal delivery at 38W1D gestation, 2/2 gHTN, PECwSF, on Magnesium    Rectal Cyto + Hemabate administered portpartum 2/2 PPH    S:    No acute events overnight.   The patient has no complaints.  Pain controlled with current treatment regimen.   She is ambulating without difficulty and tolerating PO.   + flatus/+BM/+ voiding   She endorses appropriate lochia, which is decreasing.   She denies fevers, chills, nausea and vomiting.   She denies lightheadedness, dizziness, palpitations, chest pain and SOB.     O:    T(C): 36.7 (24 @ 04:00), Max: 37.1 (24 @ 16:49)  HR: 62 (24 @ 04:00) (62 - 108)  BP: 125/76 (24 @ 04:00) (100/62 - 176/82)  RR: 18 (24 @ 04:00) (16 - 20)  SpO2: 99% (24 @ 04:00) (94% - 100%)    Gen: NAD, AOx3  Pulm: Resting comfortable on room air   Abdomen:  Soft, non-tender, non-distended  Uterus:  Fundus firm below umbilicus  VE:  Expectant lochia  Ext:  Non-tender and non-edematous                          10.5   10.31 )-----------( 201      ( 2024 08:40 )             32.5         136  |  101  |  6.4<L>  ----------------------------<  60<L>  4.1   |  19.0<L>  |  0.55    Ca    8.8      2024 18:00  Mg     4.9         TPro  7.2  /  Alb  3.5  /  TBili  0.2<L>  /  DBili  x   /  AST  18  /  ALT  16  /  AlkPhos  170<H>

## 2024-07-28 VITALS
DIASTOLIC BLOOD PRESSURE: 67 MMHG | TEMPERATURE: 98 F | HEART RATE: 64 BPM | OXYGEN SATURATION: 99 % | RESPIRATION RATE: 18 BRPM | SYSTOLIC BLOOD PRESSURE: 108 MMHG

## 2024-07-28 PROCEDURE — 93005 ELECTROCARDIOGRAM TRACING: CPT

## 2024-07-28 PROCEDURE — 80053 COMPREHEN METABOLIC PANEL: CPT

## 2024-07-28 PROCEDURE — 86803 HEPATITIS C AB TEST: CPT

## 2024-07-28 PROCEDURE — 83615 LACTATE (LD) (LDH) ENZYME: CPT

## 2024-07-28 PROCEDURE — 76815 OB US LIMITED FETUS(S): CPT

## 2024-07-28 PROCEDURE — 86703 HIV-1/HIV-2 1 RESULT ANTBDY: CPT

## 2024-07-28 PROCEDURE — 86765 RUBEOLA ANTIBODY: CPT

## 2024-07-28 PROCEDURE — 85025 COMPLETE CBC W/AUTO DIFF WBC: CPT

## 2024-07-28 PROCEDURE — 86900 BLOOD TYPING SEROLOGIC ABO: CPT

## 2024-07-28 PROCEDURE — 84550 ASSAY OF BLOOD/URIC ACID: CPT

## 2024-07-28 PROCEDURE — 86780 TREPONEMA PALLIDUM: CPT

## 2024-07-28 PROCEDURE — 85730 THROMBOPLASTIN TIME PARTIAL: CPT

## 2024-07-28 PROCEDURE — 85610 PROTHROMBIN TIME: CPT

## 2024-07-28 PROCEDURE — 85027 COMPLETE CBC AUTOMATED: CPT

## 2024-07-28 PROCEDURE — 82570 ASSAY OF URINE CREATININE: CPT

## 2024-07-28 PROCEDURE — 86850 RBC ANTIBODY SCREEN: CPT

## 2024-07-28 PROCEDURE — 87389 HIV-1 AG W/HIV-1&-2 AB AG IA: CPT

## 2024-07-28 PROCEDURE — 59050 FETAL MONITOR W/REPORT: CPT

## 2024-07-28 PROCEDURE — 84156 ASSAY OF PROTEIN URINE: CPT

## 2024-07-28 PROCEDURE — 86901 BLOOD TYPING SEROLOGIC RH(D): CPT

## 2024-07-28 PROCEDURE — 81001 URINALYSIS AUTO W/SCOPE: CPT

## 2024-07-28 PROCEDURE — 85384 FIBRINOGEN ACTIVITY: CPT

## 2024-07-28 PROCEDURE — 88307 TISSUE EXAM BY PATHOLOGIST: CPT

## 2024-07-28 PROCEDURE — 36415 COLL VENOUS BLD VENIPUNCTURE: CPT

## 2024-07-28 PROCEDURE — 83735 ASSAY OF MAGNESIUM: CPT

## 2024-07-28 RX ORDER — NIFEDIPINE 20 MG/1
1 CAPSULE, LIQUID FILLED ORAL
Qty: 30 | Refills: 0
Start: 2024-07-28

## 2024-07-28 RX ORDER — NIFEDIPINE 20 MG/1
1 CAPSULE, LIQUID FILLED ORAL
Qty: 0 | Refills: 0 | DISCHARGE
Start: 2024-07-28

## 2024-07-28 RX ORDER — ACETAMINOPHEN 500 MG
3 TABLET ORAL
Qty: 0 | Refills: 0 | DISCHARGE
Start: 2024-07-28

## 2024-07-28 RX ORDER — CRANBERRY FRUIT EXTRACT 650 MG
1 CAPSULE ORAL
Qty: 0 | Refills: 0 | DISCHARGE
Start: 2024-07-28

## 2024-07-28 RX ORDER — IBUPROFEN 200 MG
1 TABLET ORAL
Qty: 0 | Refills: 0 | DISCHARGE
Start: 2024-07-28 | End: 2024-08-10

## 2024-07-28 RX ADMIN — Medication 1 TABLET(S): at 11:14

## 2024-07-28 RX ADMIN — NIFEDIPINE 30 MILLIGRAM(S): 20 CAPSULE, LIQUID FILLED ORAL at 11:14

## 2024-07-28 NOTE — DISCHARGE NOTE OB - CARE PROVIDER_API CALL
Kenyatta Pizano  Obstetrics and Gynecology  [address] [place] [address], [address]  [place] [address]  Phone: [phone]  Fax: [phone]  Follow Up Time:

## 2024-07-28 NOTE — DISCHARGE NOTE OB - NS MD DC FALL RISK RISK
For information on Fall & Injury Prevention, visit: [email or web address] OR  [email or web address] OR  https://www.cdc.gov/steadi/patient.html

## 2024-07-28 NOTE — DISCHARGE NOTE OB - MEDICATION SUMMARY - MEDICATIONS TO TAKE
LOU Stockings, plexipulse I will START or STAY ON the medications listed below when I get home from the hospital:    ibuprofen 600 mg oral tablet  -- 1 tab(s) by mouth every 6 hours as needed for  mild pain  -- Indication: For pain    acetaminophen 325 mg oral tablet  -- 3 tab(s) by mouth every 4 hours  -- Indication: For pain     NIFEdipine 30 mg oral tablet, extended release  -- 1 tab(s) by mouth once a day  -- Indication: For high blood pressure     Prenatal Multivitamins with Folic Acid 1 mg oral tablet  -- 1 tab(s) by mouth once a day  -- Indication: For postpartum period    I will START or STAY ON the medications listed below when I get home from the hospital:    ibuprofen 600 mg oral tablet  -- 1 tab(s) by mouth every 6 hours as needed for  mild pain  -- Indication: For Normal spontaneous vaginal delivery    acetaminophen 325 mg oral tablet  -- 3 tab(s) by mouth every 4 hours  -- Indication: For Normal spontaneous vaginal delivery    NIFEdipine 30 mg oral tablet, extended release  -- 1 tab(s) by mouth once a day  -- Indication: For Normal spontaneous vaginal delivery    Prenatal Multivitamins with Folic Acid 1 mg oral tablet  -- 1 tab(s) by mouth once a day  -- Indication: For Normal spontaneous vaginal delivery

## 2024-07-28 NOTE — DISCHARGE NOTE OB - HOSPITAL COURSE
Patient underwent a normal spontaneous vaginal delivery. Patient developed pre-eclampsia with severe features and placed on Magnesium IV for 24 hours. Nifedipine started. Postpartum hemorrhage present after the delivery. Rectal Cytotec and Hemabate administered. Bleeding settled and patient endorsed normal post-partum lochia througout her stay.  Pain is well controlled with PRN medication. She has no difficulty with ambulation, voiding, or PO intake. Lab values and vital signs are within normal limits prior to discharge. Blood pressure value have normalized Patient is to not take Procardia XL if her BP is less than 110 systolic or 50 diastolic.

## 2024-07-28 NOTE — PROGRESS NOTE ADULT - ASSESSMENT
A/P:   38y  now PPD 2 s/p spontaneous vaginal delivery at gestation    -Vital signs stable  -Hgb: 8.6 -> AM labs pending . To be discharged on Fe Supplements.  -Voiding, tolerating PO, bowel function nml   -Advance care as tolerated   -Continue routine postpartum care and education  -Healthy male infant, declines circumcision  -Dispo: Pt is stable, doing well and meeting all postpartum milestones. Possible discharge to home today pending attending approval.   A/P:   38y  now PPD 2 s/p spontaneous vaginal delivery at gestation 38w4d    -Vital signs stable  -Hgb: 8.6 -> AM labs pending . To be discharged on Fe Supplements.  -Voiding, tolerating PO, bowel function nml   -Advance care as tolerated   -Continue routine postpartum care and education  -Healthy male infant, declines circumcision  -Dispo: Pt is stable, doing well and meeting all postpartum milestones. Possible discharge to home today pending attending approval.

## 2024-07-28 NOTE — PROGRESS NOTE ADULT - SUBJECTIVE AND OBJECTIVE BOX
S: Patient doing well. Minimal lochia. No complaints.     O: Vital Signs Last 24 Hrs  T(C): 36.7 (2024 08:00), Max: 36.9 (2024 00:17)  T(F): 98 (2024 08:00), Max: 98.4 (2024 00:17)  HR: 65 (2024 08:00) (65 - 81)  BP: 109/66 (2024 08:00) (105/66 - 131/76)  BP(mean): --  RR: 18 (2024 08:00) (18 - 18)  SpO2: 98% (:00) (95% - 98%)    Parameters below as of 2024 08:00  Patient On (Oxygen Delivery Method): room air        Gen: NAD  Breast : breast feeding  Abd: soft, NT, ND, fundus firm below umbilicus  Lochia moderate , voiding well.  Ext: no tenderness    Labs:                        8.6    11.42 )-----------( 175      ( 2024 07:00 )             27.0            PP # 2 s/p     Doing well.  Discharge home after 4:30PM after 24 hrs on Mg.  Nothing in vagina and no heavy lifting for 6 weeks.   Follow up 1 weeks for post partum visit for BP check.  Call office for any fevers, chills, heavy vaginal bleeding, symptoms of depression, or any other concerning symptoms.  Continue motrin 600 mg every 6 hours PRN  Will go home on Procardia.30 mg daily.

## 2024-07-28 NOTE — DISCHARGE NOTE OB - MEDICATION SUMMARY - MEDICATIONS TO STOP TAKING
I will STOP taking the medications listed below when I get home from the hospital:    aspirin 325 mg oral tablet  -- 1 tab(s) by mouth once a day   I will STOP taking the medications listed below when I get home from the hospital:  None

## 2024-07-28 NOTE — PROGRESS NOTE ADULT - SUBJECTIVE AND OBJECTIVE BOX
CHONG COTTON is a 38y  now PPD2 s/p spontaneous vaginal delivery at gestation. IOL 2/2 gHTN and GDMA1    S:    No acute events overnight.   The patient has no complaints.  Pain controlled with current treatment regimen.   She is ambulating without difficulty and tolerating PO.   + flatus/-BM/+ voiding   She endorses appropriate lochia, which is decreasing.   She denies fevers, chills, nausea and vomiting.   She denies lightheadedness, dizziness, palpitations, chest pain and SOB.     O:    T(C): 36.8 (24 @ 04:11), Max: 36.9 (24 @ 00:17)  HR: 65 (24 @ 04:11) (65 - 81)  BP: 105/66 (24 @ 04:11) (99/58 - 131/76)  RR: 18 (24 @ 04:11) (16 - 18)  SpO2: 97% (24 @ 04:11) (95% - 98%)    Gen: NAD, AOx3  Pulm: Resting comfortable on room air   Abdomen:  Soft, non-tender, non-distended  Uterus:  Fundus firm below umbilicus  VE:  Expectant lochia  Ext:  Non-tender and non-edematous                          8.6    11.42 )-----------( 175      ( 2024 07:00 )             27.0         133<L>  |  103  |  5.4<L>  ----------------------------<  92  4.1   |  22.0  |  0.52    Ca    6.8<L>      2024 07:00  Mg     4.9         TPro  5.5<L>  /  Alb  2.7<L>  /  TBili  0.3<L>  /  DBili  x   /  AST  20  /  ALT  11  /  AlkPhos  123<H>         CHONG COTTON is a 38y  now PPD2 s/p spontaneous vaginal delivery at 38w4d gestation. IOL 2/2 gHTN and GDMA1    S:    No acute events overnight.   The patient has no complaints.  Pain controlled with current treatment regimen.   She is ambulating without difficulty and tolerating PO.   + flatus/-BM/+ voiding   She endorses appropriate lochia, which is decreasing.   She denies fevers, chills, nausea and vomiting.   She denies lightheadedness, dizziness, palpitations, chest pain and SOB.     O:    T(C): 36.8 (24 @ 04:11), Max: 36.9 (24 @ 00:17)  HR: 65 (24 @ 04:11) (65 - 81)  BP: 105/66 (24 @ 04:11) (99/58 - 131/76)  RR: 18 (24 @ 04:11) (16 - 18)  SpO2: 97% (24 @ 04:11) (95% - 98%)    Gen: NAD, AOx3  Pulm: Resting comfortable on room air   Abdomen:  Soft, non-tender, non-distended  Uterus:  Fundus firm below umbilicus  VE:  Expectant lochia  Ext:  Non-tender and non-edematous                          8.6    11.42 )-----------( 175      ( 2024 07:00 )             27.0         133<L>  |  103  |  5.4<L>  ----------------------------<  92  4.1   |  22.0  |  0.52    Ca    6.8<L>      2024 07:00  Mg     4.9         TPro  5.5<L>  /  Alb  2.7<L>  /  TBili  0.3<L>  /  DBili  x   /  AST  20  /  ALT  11  /  AlkPhos  123<H>

## 2024-07-28 NOTE — DISCHARGE NOTE OB - CARE PLAN
1 Principal Discharge DX:	Normal spontaneous vaginal delivery  Assessment and plan of treatment:	1) Please take ibuprofen and/or Tylenol as needed for pain as prescribed.  2) Nothing in the vagina for 6 weeks (including no sex, no tampons, and no douching).  3) Please call your doctor for a follow up your postpartum appointment in 4-6 weeks.  4) Please continue taking vitamins postpartum.   5) Please call the office sooner if you have heavy vaginal bleeding, severe abdominal pain, or fever > 100.4F.  6) You may resume regular daily activity as tolerated  Secondary Diagnosis:	Pre-eclampsia  Assessment and plan of treatment:	1) Please take your blood pressure medication as prescribed (Procardia 30mg daily).  2) Please monitor BP at home twice daily and call your doctor if >140/90.  3) Please make an appointment with your doctor in 1 week for a blood pressure check and follow up with Card[facility].  4) Please call your doctor sooner if you start experiencing headaches, visual changes, abdominal pain, and/or new onset swelling.

## 2024-07-28 NOTE — DISCHARGE NOTE OB - CARE PROVIDERS DIRECT ADDRESSES
[email or web address]

## 2024-07-28 NOTE — DISCHARGE NOTE OB - REASON FOR ADMISSION
Induction of Labor secondary to high blood pressure readings, delivered via Normal Spontaneous Vaginal Delivery

## 2024-07-28 NOTE — DISCHARGE NOTE OB - PLAN OF CARE
1) Please take ibuprofen and/or Tylenol as needed for pain as prescribed.  2) Nothing in the vagina for 6 weeks (including no sex, no tampons, and no douching).  3) Please call your doctor for a follow up your postpartum appointment in 4-6 weeks.  4) Please continue taking vitamins postpartum.   5) Please call the office sooner if you have heavy vaginal bleeding, severe abdominal pain, or fever > 100.4F.  6) You may resume regular daily activity as tolerated 1) Please take your blood pressure medication as prescribed (Procardia 30mg daily).  2) Please monitor BP at home twice daily and call your doctor if >140/90.  3) Please make an appointment with your doctor in 1 week for a blood pressure check and follow up with CardioOB.  4) Please call your doctor sooner if you start experiencing headaches, visual changes, abdominal pain, and/or new onset swelling.

## 2024-07-28 NOTE — DISCHARGE NOTE OB - PATIENT PORTAL LINK FT
You can access the FollowMyHealth Patient Portal offered by [facility] by registering at the following website: [email or web address]. By joining [facility]’s FollowMyHealth portal, you will also be able to view your health information using other applications (apps) compatible with our system.

## 2024-08-01 ENCOUNTER — TRANSCRIPTION ENCOUNTER (OUTPATIENT)
Age: 39
End: 2024-08-01

## 2024-08-07 LAB — SURGICAL PATHOLOGY STUDY: SIGNIFICANT CHANGE UP

## 2025-04-10 ENCOUNTER — NON-APPOINTMENT (OUTPATIENT)
Age: 40
End: 2025-04-10

## 2025-05-01 ENCOUNTER — APPOINTMENT (OUTPATIENT)
Age: 40
End: 2025-05-01
Payer: MEDICAID

## 2025-05-01 ENCOUNTER — NON-APPOINTMENT (OUTPATIENT)
Age: 40
End: 2025-05-01

## 2025-05-01 VITALS — WEIGHT: 207 LBS | BODY MASS INDEX: 32.11 KG/M2 | HEIGHT: 67.5 IN

## 2025-05-01 DIAGNOSIS — M79.672 PAIN IN LEFT FOOT: ICD-10-CM

## 2025-05-01 DIAGNOSIS — L03.032 CELLULITIS OF LEFT TOE: ICD-10-CM

## 2025-05-01 DIAGNOSIS — L60.0 INGROWING NAIL: ICD-10-CM

## 2025-05-01 PROCEDURE — 99203 OFFICE O/P NEW LOW 30 MIN: CPT | Mod: 25

## 2025-05-01 PROCEDURE — 11750 EXCISION NAIL&NAIL MATRIX: CPT | Mod: T1

## 2025-05-15 ENCOUNTER — APPOINTMENT (OUTPATIENT)
Age: 40
End: 2025-05-15
Payer: MEDICAID

## 2025-05-15 DIAGNOSIS — M79.672 PAIN IN LEFT FOOT: ICD-10-CM

## 2025-05-15 DIAGNOSIS — L60.0 INGROWING NAIL: ICD-10-CM

## 2025-05-15 DIAGNOSIS — L03.032 CELLULITIS OF LEFT TOE: ICD-10-CM

## 2025-05-15 PROCEDURE — 99213 OFFICE O/P EST LOW 20 MIN: CPT

## 2025-06-06 ENCOUNTER — APPOINTMENT (OUTPATIENT)
Age: 40
End: 2025-06-06

## 2025-06-06 PROCEDURE — 11750 EXCISION NAIL&NAIL MATRIX: CPT | Mod: T8

## 2025-06-20 ENCOUNTER — APPOINTMENT (OUTPATIENT)
Age: 40
End: 2025-06-20
Payer: MEDICAID

## 2025-06-20 PROBLEM — L03.031 CELLULITIS OF TOE OF RIGHT FOOT: Status: ACTIVE | Noted: 2025-06-06

## 2025-06-20 PROBLEM — M79.671 ACUTE FOOT PAIN, RIGHT: Status: ACTIVE | Noted: 2025-06-06

## 2025-06-20 PROCEDURE — 99213 OFFICE O/P EST LOW 20 MIN: CPT

## 2025-07-15 ENCOUNTER — APPOINTMENT (OUTPATIENT)
Dept: CARDIOLOGY | Facility: CLINIC | Age: 40
End: 2025-07-15

## 2025-09-12 ENCOUNTER — NON-APPOINTMENT (OUTPATIENT)
Age: 40
End: 2025-09-12